# Patient Record
Sex: FEMALE | Race: OTHER | HISPANIC OR LATINO
[De-identification: names, ages, dates, MRNs, and addresses within clinical notes are randomized per-mention and may not be internally consistent; named-entity substitution may affect disease eponyms.]

---

## 2022-08-04 ENCOUNTER — APPOINTMENT (OUTPATIENT)
Dept: ANTEPARTUM | Facility: CLINIC | Age: 42
End: 2022-08-04

## 2022-08-25 ENCOUNTER — ASOB RESULT (OUTPATIENT)
Age: 42
End: 2022-08-25

## 2022-08-25 ENCOUNTER — APPOINTMENT (OUTPATIENT)
Dept: ANTEPARTUM | Facility: CLINIC | Age: 42
End: 2022-08-25

## 2022-08-25 PROCEDURE — 76811 OB US DETAILED SNGL FETUS: CPT

## 2022-08-25 PROCEDURE — 76819 FETAL BIOPHYS PROFIL W/O NST: CPT

## 2022-09-02 ENCOUNTER — INPATIENT (INPATIENT)
Facility: HOSPITAL | Age: 42
LOS: 0 days | Discharge: HOME | End: 2022-09-03
Attending: OBSTETRICS & GYNECOLOGY | Admitting: OBSTETRICS & GYNECOLOGY

## 2022-09-02 VITALS
SYSTOLIC BLOOD PRESSURE: 129 MMHG | DIASTOLIC BLOOD PRESSURE: 77 MMHG | TEMPERATURE: 98 F | RESPIRATION RATE: 17 BRPM | HEART RATE: 90 BPM

## 2022-09-02 DIAGNOSIS — Z3A.00 WEEKS OF GESTATION OF PREGNANCY NOT SPECIFIED: ICD-10-CM

## 2022-09-02 DIAGNOSIS — O26.899 OTHER SPECIFIED PREGNANCY RELATED CONDITIONS, UNSPECIFIED TRIMESTER: ICD-10-CM

## 2022-09-02 DIAGNOSIS — O47.03 FALSE LABOR BEFORE 37 COMPLETED WEEKS OF GESTATION, THIRD TRIMESTER: ICD-10-CM

## 2022-09-02 DIAGNOSIS — Z98.82 BREAST IMPLANT STATUS: Chronic | ICD-10-CM

## 2022-09-02 DIAGNOSIS — Z98.891 HISTORY OF UTERINE SCAR FROM PREVIOUS SURGERY: Chronic | ICD-10-CM

## 2022-09-02 LAB
APPEARANCE UR: CLEAR — SIGNIFICANT CHANGE UP
APTT BLD: 26.2 SEC — LOW (ref 27–36.3)
BASOPHILS # BLD AUTO: 0.03 K/UL — SIGNIFICANT CHANGE UP (ref 0–0.2)
BASOPHILS NFR BLD AUTO: 0.4 % — SIGNIFICANT CHANGE UP (ref 0–2)
BILIRUB UR-MCNC: NEGATIVE — SIGNIFICANT CHANGE UP
BLD GP AB SCN SERPL QL: NEGATIVE — SIGNIFICANT CHANGE UP
COLOR SPEC: SIGNIFICANT CHANGE UP
COVID-19 SPIKE DOMAIN AB INTERP: POSITIVE
COVID-19 SPIKE DOMAIN ANTIBODY RESULT: >250 U/ML — HIGH
DIFF PNL FLD: NEGATIVE — SIGNIFICANT CHANGE UP
EOSINOPHIL # BLD AUTO: 0.07 K/UL — SIGNIFICANT CHANGE UP (ref 0–0.5)
EOSINOPHIL NFR BLD AUTO: 0.9 % — SIGNIFICANT CHANGE UP (ref 0–6)
FIBRINOGEN PPP-MCNC: 753 MG/DL — HIGH (ref 330–520)
GLUCOSE UR QL: NEGATIVE — SIGNIFICANT CHANGE UP
HCT VFR BLD CALC: 32.7 % — LOW (ref 34.5–45)
HGB BLD-MCNC: 10.8 G/DL — LOW (ref 11.5–15.5)
IANC: 4.96 K/UL — SIGNIFICANT CHANGE UP (ref 1.8–7.4)
IMM GRANULOCYTES NFR BLD AUTO: 0.7 % — SIGNIFICANT CHANGE UP (ref 0–1.5)
INR BLD: 0.95 RATIO — SIGNIFICANT CHANGE UP (ref 0.88–1.16)
KETONES UR-MCNC: NEGATIVE — SIGNIFICANT CHANGE UP
LEUKOCYTE ESTERASE UR-ACNC: NEGATIVE — SIGNIFICANT CHANGE UP
LYMPHOCYTES # BLD AUTO: 1.78 K/UL — SIGNIFICANT CHANGE UP (ref 1–3.3)
LYMPHOCYTES # BLD AUTO: 23.5 % — SIGNIFICANT CHANGE UP (ref 13–44)
MCHC RBC-ENTMCNC: 29.3 PG — SIGNIFICANT CHANGE UP (ref 27–34)
MCHC RBC-ENTMCNC: 33 GM/DL — SIGNIFICANT CHANGE UP (ref 32–36)
MCV RBC AUTO: 88.9 FL — SIGNIFICANT CHANGE UP (ref 80–100)
MONOCYTES # BLD AUTO: 0.68 K/UL — SIGNIFICANT CHANGE UP (ref 0–0.9)
MONOCYTES NFR BLD AUTO: 9 % — SIGNIFICANT CHANGE UP (ref 2–14)
NEUTROPHILS # BLD AUTO: 4.96 K/UL — SIGNIFICANT CHANGE UP (ref 1.8–7.4)
NEUTROPHILS NFR BLD AUTO: 65.5 % — SIGNIFICANT CHANGE UP (ref 43–77)
NITRITE UR-MCNC: NEGATIVE — SIGNIFICANT CHANGE UP
NRBC # BLD: 0 /100 WBCS — SIGNIFICANT CHANGE UP (ref 0–0)
NRBC # FLD: 0 K/UL — SIGNIFICANT CHANGE UP (ref 0–0)
PH UR: 7 — SIGNIFICANT CHANGE UP (ref 5–8)
PLATELET # BLD AUTO: 187 K/UL — SIGNIFICANT CHANGE UP (ref 150–400)
PROT UR-MCNC: NEGATIVE — SIGNIFICANT CHANGE UP
PROTHROM AB SERPL-ACNC: 11 SEC — SIGNIFICANT CHANGE UP (ref 10.5–13.4)
RBC # BLD: 3.68 M/UL — LOW (ref 3.8–5.2)
RBC # FLD: 13.2 % — SIGNIFICANT CHANGE UP (ref 10.3–14.5)
RH IG SCN BLD-IMP: POSITIVE — SIGNIFICANT CHANGE UP
RH IG SCN BLD-IMP: POSITIVE — SIGNIFICANT CHANGE UP
SARS-COV-2 IGG+IGM SERPL QL IA: >250 U/ML — HIGH
SARS-COV-2 IGG+IGM SERPL QL IA: POSITIVE
SARS-COV-2 RNA SPEC QL NAA+PROBE: SIGNIFICANT CHANGE UP
SP GR SPEC: 1.01 — SIGNIFICANT CHANGE UP (ref 1.01–1.05)
T PALLIDUM AB TITR SER: NEGATIVE — SIGNIFICANT CHANGE UP
UROBILINOGEN FLD QL: SIGNIFICANT CHANGE UP
WBC # BLD: 7.57 K/UL — SIGNIFICANT CHANGE UP (ref 3.8–10.5)
WBC # FLD AUTO: 7.57 K/UL — SIGNIFICANT CHANGE UP (ref 3.8–10.5)

## 2022-09-02 RX ORDER — SODIUM CHLORIDE 9 MG/ML
1000 INJECTION, SOLUTION INTRAVENOUS
Refills: 0 | Status: DISCONTINUED | OUTPATIENT
Start: 2022-09-02 | End: 2022-09-03

## 2022-09-02 RX ORDER — FAMOTIDINE 10 MG/ML
20 INJECTION INTRAVENOUS ONCE
Refills: 0 | Status: DISCONTINUED | OUTPATIENT
Start: 2022-09-02 | End: 2022-09-03

## 2022-09-02 RX ORDER — SODIUM CHLORIDE 9 MG/ML
1000 INJECTION, SOLUTION INTRAVENOUS ONCE
Refills: 0 | Status: COMPLETED | OUTPATIENT
Start: 2022-09-02 | End: 2022-09-02

## 2022-09-02 RX ORDER — INFLUENZA VIRUS VACCINE 15; 15; 15; 15 UG/.5ML; UG/.5ML; UG/.5ML; UG/.5ML
0.5 SUSPENSION INTRAMUSCULAR ONCE
Refills: 0 | Status: DISCONTINUED | OUTPATIENT
Start: 2022-09-02 | End: 2022-09-03

## 2022-09-02 RX ADMIN — SODIUM CHLORIDE 1000 MILLILITER(S): 9 INJECTION, SOLUTION INTRAVENOUS at 08:45

## 2022-09-02 NOTE — OB PROVIDER TRIAGE NOTE - NS_CONTRACTPATTER_OBGYN_ALL_OB
----- Message from Ana Dow, RN sent at 6/8/2021  8:21 AM CDT -----  Regarding: CHF Weekly Hospital 30D  CHF weekly and PRN follow up. DC 6/7/21.  Follow up acutely through 7/7/21.  DC to [facility].        [facility]   P [phone] (option 3)  F        Uterine Irritability

## 2022-09-02 NOTE — OB PROVIDER LABOR PROGRESS NOTE - ASSESSMENT
40yo  @35+4 being observed for r/o PTL    - SVE unchanged: 0/0/-3  - FHT Cat 1, reassuring, ctm  - Patient not currently in labor. Will continue to monitor overnight.  - Patient may eat 1 meal and then go back to [facility]    d/w Dr. Chuy Styles, PGY2

## 2022-09-02 NOTE — OB PROVIDER H&P - NSHPLABSRESULTS_GEN_ALL_CORE
Urinalysis Basic - ( 02 Sep 2022 07:58 )    Color: Light Yellow / Appearance: Clear / S.010 / pH: x  Gluc: x / Ketone: Negative  / Bili: Negative / Urobili: <2 mg/dL   Blood: x / Protein: Negative / Nitrite: Negative   Leuk Esterase: Negative / RBC: x / WBC x   Sq Epi: x / Non Sq Epi: x / Bacteria: x    CBC Full  -  ( 02 Sep 2022 08:55 )  WBC Count : 7.57 K/uL  RBC Count : 3.68 M/uL  Hemoglobin : 10.8 g/dL  Hematocrit : 32.7 %  Platelet Count - Automated : 187 K/uL  Mean Cell Volume : 88.9 fL  Mean Cell Hemoglobin : 29.3 pg  Mean Cell Hemoglobin Concentration : 33.0 gm/dL  Auto Neutrophil # : 4.96 K/uL  Auto Lymphocyte # : 1.78 K/uL  Auto Monocyte # : 0.68 K/uL  Auto Eosinophil # : 0.07 K/uL  Auto Basophil # : 0.03 K/uL  Auto Neutrophil % : 65.5 %  Auto Lymphocyte % : 23.5 %  Auto Monocyte % : 9.0 %  Auto Eosinophil % : 0.9 %  Auto Basophil % : 0.4 %    PT/INR - ( 02 Sep 2022 08:55 )   PT: 11.0 sec;   INR: 0.95 ratio         PTT - ( 02 Sep 2022 08:55 )  PTT:26.2 sec

## 2022-09-02 NOTE — OB RN PATIENT PROFILE - FUNCTIONAL ASSESSMENT - BASIC MOBILITY 6.
4-calculated by average/Not able to assess (calculate score using [facility] averaging method)

## 2022-09-02 NOTE — OB PROVIDER TRIAGE NOTE - NSHPLABSRESULTS_GEN_ALL_CORE
Urinalysis Basic - ( 02 Sep 2022 07:58 )    Color: Light Yellow / Appearance: Clear / S.010 / pH: x  Gluc: x / Ketone: Negative  / Bili: Negative / Urobili: <2 mg/dL   Blood: x / Protein: Negative / Nitrite: Negative   Leuk Esterase: Negative / RBC: x / WBC x   Sq Epi: x / Non Sq Epi: x / Bacteria: x

## 2022-09-02 NOTE — OB PROVIDER TRIAGE NOTE - NS_OBGYNHISTORY_OBGYN_ALL_OB_FT
4/16/2020 csection failure to descend 3815g  sab x1 complete breast augmentation  complete sab x 1  4/16/2022 pLTCS failure to decent 8lbs 7oz breast augmentation  complete sab x 1  4/16/2020 pLTCS failure to decent 8lbs 7oz

## 2022-09-02 NOTE — OB RN TRIAGE NOTE - FALL HARM RISK - UNIVERSAL INTERVENTIONS
Bed in lowest position, wheels locked, appropriate side rails in place/Call bell, personal items and telephone in reach/Instruct patient to call for assistance before getting out of bed or chair/Non-slip footwear when patient is out of bed/[place] to call system/Physically safe environment - no spills, clutter or unnecessary equipment/Purposeful Proactive Rounding/Room/bathroom lighting operational, light cord in reach

## 2022-09-02 NOTE — OB RN PATIENT PROFILE - FALL HARM RISK - UNIVERSAL INTERVENTIONS
Bed in lowest position, wheels locked, appropriate side rails in place/Call bell, personal items and telephone in reach/Instruct patient to call for assistance before getting out of bed or chair/Non-slip footwear when patient is out of bed/[place] to call system/Physically safe environment - no spills, clutter or unnecessary equipment/Purposeful Proactive Rounding/Room/bathroom lighting operational, light cord in reach

## 2022-09-02 NOTE — OB PROVIDER LABOR PROGRESS NOTE - NS_SUBJECTIVE/OBJECTIVE_OBGYN_ALL_OB_FT
Patient miguel ángel q3-4 min but does not feel them. She is comfortable.    T(C): 36.7 (09-02-22 @ 10:46), Max: 36.8 (09-02-22 @ 07:25)  HR: 59 (09-02-22 @ 16:29) (59 - 90)  BP: 117/74 (09-02-22 @ 16:29) (112/67 - 158/80)  RR: 18 (09-02-22 @ 10:46) (17 - 18)  SpO2: --

## 2022-09-02 NOTE — OB PROVIDER H&P - NSHPPHYSICALEXAM_GEN_ALL_CORE
abdomen soft, nontender  taus: sliup, cephalic presentation, posterior placenta, bpp 8/8, juanita 18.49, efw 3417 grams  sve: 0/50/-3/I  nst in progress

## 2022-09-02 NOTE — OB PROVIDER TRIAGE NOTE - HISTORY OF PRESENT ILLNESS
41 y.o.  CAMILA 10/10/2022 @ 34.4 weeks presents with c/o pelvic pain with ambulation 3/10 pain. Pt denies LOF, VB, ctx. States +FM.     pt is vaccinated against covid 19    med/surg hx:  cs x 1  bilateral breast augmentation    OBGYN hx:  breast augmentation  complete sab x 1  2022 pLTCS failure to decent 8lbs 7oz 41 y.o.  CAMILA 10/10/2022 @ 34.4 weeks presents with c/o pelvic pain with ambulation 3/10 pain. Pt denies LOF, VB, ctx. States +FM.     pt denies hx of covid 19 infection. pt is vaccinated against covid 19 x 3.    med/surg hx:  cs x 1  bilateral breast augmentation    OBGYN hx:  breast augmentation  complete sab x 1  2022 pLTCS failure to decent 8lbs 7oz 41 y.o.  CAMILA 10/10/2022 @ 34.4 weeks presents with c/o pelvic pain with ambulation 3/10 pain. Pt denies LOF, VB, ctx. States +FM.     pt denies hx of covid 19 infection. pt is vaccinated against covid 19 x 3.    med/surg hx:  cs x 1  bilateral breast augmentation    OBGYN hx:  breast augmentation  complete sab x 1  2020 pLTCS failure to decent 8lbs 7oz 41 y.o.  CAMILA 10/03/2022 @ 35.4 weeks presents with c/o pelvic pain with ambulation 3/10 pain. Pt denies LOF, VB, ctx. States +FM.     pt denies hx of covid 19 infection. pt is vaccinated against covid 19 x 3.    med/surg hx:  cs x 1  bilateral breast augmentation    OBGYN hx:  breast augmentation  complete sab x 1  2020 pLTCS failure to decent 8lbs 7oz

## 2022-09-02 NOTE — OB PROVIDER H&P - HISTORY OF PRESENT ILLNESS
41 y.o.  CAMILA 10/10/2022 @ 34.4 weeks presents with c/o pelvic pain with ambulation 3/10 pain. Pt denies LOF, VB, ctx. States +FM.     pt denies hx of covid 19 infection. pt is vaccinated against covid 19 x 3.    med/surg hx:  cs x 1  bilateral breast augmentation    OBGYN hx:  breast augmentation  complete sab x 1  2020 pLTCS failure to decent 8lbs 7oz 41 y.o.  CAMILA 10/10/2022 @ 35.4 weeks presents with c/o pelvic pain with ambulation 3/10 pain. Pt denies LOF, VB, ctx. States +FM.     pt denies hx of covid 19 infection. pt is vaccinated against covid 19 x 3.    med/surg hx:  cs x 1  bilateral breast augmentation    OBGYN hx:  breast augmentation  complete sab x 1  2020 pLTCS failure to decent 8lbs 7oz 41 y.o.  CAMILA 10/03/2022 @ 35.4 weeks presents with c/o pelvic pain with ambulation 3/10 pain. Pt denies LOF, VB, ctx. States +FM.     pt denies hx of covid 19 infection. pt is vaccinated against covid 19 x 3.    med/surg hx:  cs x 1  bilateral breast augmentation    OBGYN hx:  breast augmentation  complete sab x 1  2020 pLTCS failure to decent 8lbs 7oz

## 2022-09-02 NOTE — OB PROVIDER H&P - ASSESSMENT
a/p: 41 y.o.  CAMILA 10/10/2022 @ 34.4 weeks  contractions for observation    ordered labs reviewed: WNL  Discussed with Dr Chuy Dobbs at bedside. Plan for admission and observation. Plan of care reviewed with patient.     covid 19 swab collected and pending    Adenike, NP a/p: 41 y.o.  CAMILA 10/10/2022 @ 35.4 weeks  contractions for observation    ordered labs reviewed: WNL  Discussed with Dr Chuy Dobbs at bedside. Plan for admission and observation. Plan of care reviewed with patient.     covid 19 swab collected and pending    Adenike, NP a/p: 41 y.o.  CAMILA 10/03/2022 @ 35.4 weeks  contractions for observation    ordered labs reviewed: WNL  Discussed with Dr Chuy Dobbs at bedside. Plan for admission and observation. Plan of care reviewed with patient.     covid 19 swab collected and pending    Adenike, NP

## 2022-09-02 NOTE — OB PROVIDER TRIAGE NOTE - NSHPPHYSICALEXAM_GEN_ALL_CORE
abdomen soft, nontender  sve: 0/50/-3/I  nst in progress abdomen soft, nontender  taus: sliup, cephalic presentation, posterior placenta, bpp 8/8, juanita 18.49, efw 3417 grams  sve: 0/50/-3/I  nst in progress

## 2022-09-03 ENCOUNTER — TRANSCRIPTION ENCOUNTER (OUTPATIENT)
Age: 42
End: 2022-09-03

## 2022-09-03 VITALS — DIASTOLIC BLOOD PRESSURE: 75 MMHG | HEART RATE: 76 BPM | SYSTOLIC BLOOD PRESSURE: 115 MMHG

## 2022-09-03 RX ADMIN — SODIUM CHLORIDE 125 MILLILITER(S): 9 INJECTION, SOLUTION INTRAVENOUS at 07:53

## 2022-09-03 NOTE — DISCHARGE NOTE ANTEPARTUM - PLAN OF CARE
Patient examined and found to make no cervical change (0/0/-3) after a period of observation. Patient also did not feel any contractions and fetal status reassuring.

## 2022-09-03 NOTE — DISCHARGE NOTE ANTEPARTUM - PATIENT PORTAL LINK FT
You can access the FollowMyHealth Patient Portal offered by [facility] by registering at the following website: [email or web address]. By joining [employer]’s FollowMyHealth portal, you will also be able to view your health information using other applications (apps) compatible with our system.

## 2022-09-03 NOTE — CHART NOTE - NSCHARTNOTEFT_GEN_A_CORE
R3 Chart Note     Patient seen bedside for reevaluation. She continues to report that she does not feel any contractions, continues to have some groin pain. While speaking to patient she began vomiting which she reports occurs when she does not have food.    Vital Signs Last 24 Hrs  T(C): 36.7 (03 Sep 2022 07:15), Max: 36.9 (02 Sep 2022 20:08)  T(F): 98.06 (03 Sep 2022 07:15), Max: 98.42 (02 Sep 2022 20:08)  HR: 60 (03 Sep 2022 07:15) (59 - 75)  BP: 125/80 (03 Sep 2022 07:15) (106/54 - 158/80)  BP(mean): --  RR: 22 (03 Sep 2022 07:13) (18 - 22)  SpO2: --    General: vomiting, otherwise NAD   SVE: 0/0/-3   FHR: 130s, moderate variability, accelerations present, no decels   [place]: q3-4 min    41 y.o.  at 35w5d presenting with groin pain found to be miguel ángel regularly. Patient has not felt the contractions throughout the night, exam unchanged.     - Discharge with  precautions   - f/u as scheduled with OB     d/w Dr. Freya Nielson PGY-3 R3 Chart Note     Patient seen bedside for reevaluation. She continues to report that she does not feel any contractions, continues to have some groin pain. While speaking to patient she began vomiting which she reports occurs when she does not have food.    Vital Signs Last 24 Hrs  T(C): 36.7 (03 Sep 2022 07:15), Max: 36.9 (02 Sep 2022 20:08)  T(F): 98.06 (03 Sep 2022 07:15), Max: 98.42 (02 Sep 2022 20:08)  HR: 60 (03 Sep 2022 07:15) (59 - 75)  BP: 125/80 (03 Sep 2022 07:15) (106/54 - 158/80)  BP(mean): --  RR: 22 (03 Sep 2022 07:13) (18 - 22)  SpO2: --    General: vomiting, otherwise NAD   SVE: 0/0/-3   FHR: baseline 125, moderate variability, accelerations present, no decels   [place]: q3-4 min    41 y.o.  at 35w5d presenting with groin pain found to be miguel ángel regularly. Patient has not felt the contractions throughout the night, exam unchanged.     - Discharge with  precautions   - f/u as scheduled with OB     d/w Dr. Freya Nielson PGY-3

## 2022-09-03 NOTE — DISCHARGE NOTE ANTEPARTUM - CARE PLAN
1 Principal Discharge DX:	 contractions  Assessment and plan of treatment:	Patient examined and found to make no cervical change (0/0/-3) after a period of observation. Patient also did not feel any contractions and fetal status reassuring.

## 2022-09-03 NOTE — PROGRESS NOTE ADULT - SUBJECTIVE AND OBJECTIVE BOX
R3 Antepartum Note, HD#2    Interval events: Patient seen and examined at bedside, no acute overnight events. No acute complaints. Not reporting any contraction pain. Pt reports +FM, denies LOF, VB,  HA, epigastric pain, blurred vision, CP, SOB, N/V, fevers, and chills.    Vital Signs Last 24 Hours  T(C): 36.7 (09-03-22 @ 03:50), Max: 36.9 (09-02-22 @ 20:08)  HR: 75 (09-03-22 @ 03:52) (59 - 90)  BP: 130/60 (09-03-22 @ 03:52) (112/67 - 158/80)  RR: 18 (09-02-22 @ 10:46) (17 - 18)  SpO2: --  Physical Exam:  General: NAD  Abdomen: Soft, non-tender, gravid  Ext: No pain or swelling    NST reactive overnight  [place]:q3 min    Labs:             10.8   7.57  )-----------( 187      ( 09-02 @ 08:55 )             32.7         PT/INR - ( 09-02 @ 08:55 )   PT: 11.0 sec;   INR: 0.95 ratio    PTT - ( 09-02 @ 08:55 )  PTT:26.2 sec    Uric Acid: (09-02 @ 08:55)  --       Fibrinogen: (09-02 @ 08:55)  753      LDH: (09-02 @ 08:55)  --         MEDICATIONS  (STANDING):  famotidine Injectable 20 milliGRAM(s) IV Push once  influenza   Vaccine 0.5 milliLiter(s) IntraMuscular once  lactated ringers. 1000 milliLiter(s) (125 mL/Hr) IV Continuous <Continuous>

## 2022-09-03 NOTE — DISCHARGE NOTE ANTEPARTUM - HOSPITAL COURSE
41 y.o.  presented initially at 35w4d with complaints of pelvic pain with ambulation. She did not report contraction pain. She was examined, found to be closed but miguel ángel regularly so was kept for observation overnight given history of prior  delivery. Labs were done and she was noted to be anemic (10.8/32.7), otherwise within normal limits. She was reexamined several hours later and found to not make any cervical change and was discharged with  labor precautions and to follow up closely with Dr. Jacobo.

## 2022-09-03 NOTE — DISCHARGE NOTE ANTEPARTUM - MEDICATION SUMMARY - MEDICATIONS TO TAKE
I will START or STAY ON the medications listed below when I get home from the hospital:    PNV Prenatal oral tablet  -- 1 tab(s) by mouth once a day  -- Indication: For Prenatal

## 2022-09-03 NOTE — PROGRESS NOTE ADULT - ASSESSMENT
41 y.o.  CAMILA 10/03/2022 @ 35.5 weeks with hx of prior CS presented with pelvic pain, monitored overnight in the setting of contractions q3 min. Patient overall comfortable.   - continuous EFM, [place]  - SCD's for DVT ppx  - NPO    Sasha Adams, PGY-3

## 2022-09-03 NOTE — DISCHARGE NOTE ANTEPARTUM - NS MD DC FALL RISK RISK
For information on Fall & Injury Prevention, visit: [email or web address] OR  [email or web address] OR  https://www.cdc.gov/steadi/patient.html

## 2022-09-03 NOTE — DISCHARGE NOTE ANTEPARTUM - ADDITIONAL INSTRUCTIONS
You were admitted for observation due to having  contractions. Return to labor and delivery if you have vaginal bleeding, leakage of fluid, regular contractions, or the baby is not moving well.

## 2022-09-11 PROBLEM — Z78.9 OTHER SPECIFIED HEALTH STATUS: Chronic | Status: ACTIVE | Noted: 2022-09-02

## 2022-09-14 ENCOUNTER — TRANSCRIPTION ENCOUNTER (OUTPATIENT)
Age: 42
End: 2022-09-14

## 2022-09-14 ENCOUNTER — INPATIENT (INPATIENT)
Facility: HOSPITAL | Age: 42
LOS: 2 days | Discharge: HOME | End: 2022-09-17
Attending: OBSTETRICS & GYNECOLOGY | Admitting: OBSTETRICS & GYNECOLOGY

## 2022-09-14 VITALS
SYSTOLIC BLOOD PRESSURE: 136 MMHG | DIASTOLIC BLOOD PRESSURE: 84 MMHG | HEART RATE: 80 BPM | TEMPERATURE: 98 F | RESPIRATION RATE: 17 BRPM

## 2022-09-14 DIAGNOSIS — Z3A.00 WEEKS OF GESTATION OF PREGNANCY NOT SPECIFIED: ICD-10-CM

## 2022-09-14 DIAGNOSIS — O13.3 GESTATIONAL [PREGNANCY-INDUCED] HYPERTENSION WITHOUT SIGNIFICANT PROTEINURIA, THIRD TRIMESTER: ICD-10-CM

## 2022-09-14 DIAGNOSIS — Z98.891 HISTORY OF UTERINE SCAR FROM PREVIOUS SURGERY: Chronic | ICD-10-CM

## 2022-09-14 DIAGNOSIS — O14.93 UNSPECIFIED PRE-ECLAMPSIA, THIRD TRIMESTER: ICD-10-CM

## 2022-09-14 DIAGNOSIS — O26.899 OTHER SPECIFIED PREGNANCY RELATED CONDITIONS, UNSPECIFIED TRIMESTER: ICD-10-CM

## 2022-09-14 DIAGNOSIS — Z98.82 BREAST IMPLANT STATUS: Chronic | ICD-10-CM

## 2022-09-14 LAB
ALBUMIN SERPL ELPH-MCNC: 3.4 G/DL — SIGNIFICANT CHANGE UP (ref 3.3–5)
ALP SERPL-CCNC: 231 U/L — HIGH (ref 40–120)
ALT FLD-CCNC: 27 U/L — SIGNIFICANT CHANGE UP (ref 4–33)
ANION GAP SERPL CALC-SCNC: 10 MMOL/L — SIGNIFICANT CHANGE UP (ref 7–14)
APPEARANCE UR: CLEAR — SIGNIFICANT CHANGE UP
APTT BLD: 27.3 SEC — SIGNIFICANT CHANGE UP (ref 27–36.3)
AST SERPL-CCNC: 26 U/L — SIGNIFICANT CHANGE UP (ref 4–32)
BACTERIA # UR AUTO: NEGATIVE — SIGNIFICANT CHANGE UP
BASOPHILS # BLD AUTO: 0.03 K/UL — SIGNIFICANT CHANGE UP (ref 0–0.2)
BASOPHILS NFR BLD AUTO: 0.4 % — SIGNIFICANT CHANGE UP (ref 0–2)
BILIRUB SERPL-MCNC: 0.3 MG/DL — SIGNIFICANT CHANGE UP (ref 0.2–1.2)
BILIRUB UR-MCNC: NEGATIVE — SIGNIFICANT CHANGE UP
BLD GP AB SCN SERPL QL: NEGATIVE — SIGNIFICANT CHANGE UP
BUN SERPL-MCNC: 12 MG/DL — SIGNIFICANT CHANGE UP (ref 7–23)
CALCIUM SERPL-MCNC: 8.9 MG/DL — SIGNIFICANT CHANGE UP (ref 8.4–10.5)
CHLORIDE SERPL-SCNC: 105 MMOL/L — SIGNIFICANT CHANGE UP (ref 98–107)
CO2 SERPL-SCNC: 20 MMOL/L — LOW (ref 22–31)
COLOR SPEC: COLORLESS — SIGNIFICANT CHANGE UP
CREAT ?TM UR-MCNC: 24 MG/DL — SIGNIFICANT CHANGE UP
CREAT SERPL-MCNC: 0.55 MG/DL — SIGNIFICANT CHANGE UP (ref 0.5–1.3)
DIFF PNL FLD: NEGATIVE — SIGNIFICANT CHANGE UP
EGFR: 119 ML/MIN/1.73M2 — SIGNIFICANT CHANGE UP
EOSINOPHIL # BLD AUTO: 0.04 K/UL — SIGNIFICANT CHANGE UP (ref 0–0.5)
EOSINOPHIL NFR BLD AUTO: 0.6 % — SIGNIFICANT CHANGE UP (ref 0–6)
EPI CELLS # UR: 2 /HPF — SIGNIFICANT CHANGE UP (ref 0–5)
FIBRINOGEN PPP-MCNC: 765 MG/DL — HIGH (ref 330–520)
GLUCOSE SERPL-MCNC: 82 MG/DL — SIGNIFICANT CHANGE UP (ref 70–99)
GLUCOSE UR QL: NEGATIVE — SIGNIFICANT CHANGE UP
HCT VFR BLD CALC: 32.7 % — LOW (ref 34.5–45)
HGB BLD-MCNC: 11.1 G/DL — LOW (ref 11.5–15.5)
IANC: 4.36 K/UL — SIGNIFICANT CHANGE UP (ref 1.8–7.4)
IMM GRANULOCYTES NFR BLD AUTO: 0.6 % — SIGNIFICANT CHANGE UP (ref 0–1.5)
INR BLD: 0.92 RATIO — SIGNIFICANT CHANGE UP (ref 0.88–1.16)
KETONES UR-MCNC: NEGATIVE — SIGNIFICANT CHANGE UP
LDH SERPL L TO P-CCNC: 188 U/L — SIGNIFICANT CHANGE UP (ref 135–225)
LEUKOCYTE ESTERASE UR-ACNC: ABNORMAL
LYMPHOCYTES # BLD AUTO: 1.75 K/UL — SIGNIFICANT CHANGE UP (ref 1–3.3)
LYMPHOCYTES # BLD AUTO: 25.6 % — SIGNIFICANT CHANGE UP (ref 13–44)
MCHC RBC-ENTMCNC: 29.3 PG — SIGNIFICANT CHANGE UP (ref 27–34)
MCHC RBC-ENTMCNC: 33.9 GM/DL — SIGNIFICANT CHANGE UP (ref 32–36)
MCV RBC AUTO: 86.3 FL — SIGNIFICANT CHANGE UP (ref 80–100)
MONOCYTES # BLD AUTO: 0.62 K/UL — SIGNIFICANT CHANGE UP (ref 0–0.9)
MONOCYTES NFR BLD AUTO: 9.1 % — SIGNIFICANT CHANGE UP (ref 2–14)
NEUTROPHILS # BLD AUTO: 4.36 K/UL — SIGNIFICANT CHANGE UP (ref 1.8–7.4)
NEUTROPHILS NFR BLD AUTO: 63.7 % — SIGNIFICANT CHANGE UP (ref 43–77)
NITRITE UR-MCNC: NEGATIVE — SIGNIFICANT CHANGE UP
NRBC # BLD: 0 /100 WBCS — SIGNIFICANT CHANGE UP (ref 0–0)
NRBC # FLD: 0 K/UL — SIGNIFICANT CHANGE UP (ref 0–0)
PH UR: 7 — SIGNIFICANT CHANGE UP (ref 5–8)
PLATELET # BLD AUTO: 181 K/UL — SIGNIFICANT CHANGE UP (ref 150–400)
POTASSIUM SERPL-MCNC: 4.2 MMOL/L — SIGNIFICANT CHANGE UP (ref 3.5–5.3)
POTASSIUM SERPL-SCNC: 4.2 MMOL/L — SIGNIFICANT CHANGE UP (ref 3.5–5.3)
PROT ?TM UR-MCNC: 12 MG/DL — SIGNIFICANT CHANGE UP
PROT ?TM UR-MCNC: 12 MG/DL — SIGNIFICANT CHANGE UP
PROT SERPL-MCNC: 6.8 G/DL — SIGNIFICANT CHANGE UP (ref 6–8.3)
PROT UR-MCNC: NEGATIVE — SIGNIFICANT CHANGE UP
PROT/CREAT UR-RTO: 0.5 RATIO — HIGH (ref 0–0.2)
PROTHROM AB SERPL-ACNC: 10.7 SEC — SIGNIFICANT CHANGE UP (ref 10.5–13.4)
RBC # BLD: 3.79 M/UL — LOW (ref 3.8–5.2)
RBC # FLD: 13.8 % — SIGNIFICANT CHANGE UP (ref 10.3–14.5)
RBC CASTS # UR COMP ASSIST: 1 /HPF — SIGNIFICANT CHANGE UP (ref 0–4)
RH IG SCN BLD-IMP: POSITIVE — SIGNIFICANT CHANGE UP
SARS-COV-2 RNA SPEC QL NAA+PROBE: SIGNIFICANT CHANGE UP
SODIUM SERPL-SCNC: 135 MMOL/L — SIGNIFICANT CHANGE UP (ref 135–145)
SP GR SPEC: 1.01 — LOW (ref 1.01–1.05)
URATE SERPL-MCNC: 5.1 MG/DL — SIGNIFICANT CHANGE UP (ref 2.5–7)
UROBILINOGEN FLD QL: SIGNIFICANT CHANGE UP
WBC # BLD: 6.84 K/UL — SIGNIFICANT CHANGE UP (ref 3.8–10.5)
WBC # FLD AUTO: 6.84 K/UL — SIGNIFICANT CHANGE UP (ref 3.8–10.5)
WBC UR QL: 1 /HPF — SIGNIFICANT CHANGE UP (ref 0–5)

## 2022-09-14 RX ORDER — INFLUENZA VIRUS VACCINE 15; 15; 15; 15 UG/.5ML; UG/.5ML; UG/.5ML; UG/.5ML
0.5 SUSPENSION INTRAMUSCULAR ONCE
Refills: 0 | Status: DISCONTINUED | OUTPATIENT
Start: 2022-09-14 | End: 2022-09-17

## 2022-09-14 NOTE — OB PROVIDER H&P - HISTORY OF PRESENT ILLNESS
[phone] 41y  36w2d presenting for evaluation for elevated BP at work today. BP@ 17:24 152/94. 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   Denies HA, N/V, epigastric pain, visual changes. Denies LOF, VB and reports GFM. Patient given 24 hour urine bucket to complete and return to office next week due to leg swelling.   Patient admitted on  for pre-term ctx, at that time some systolic BP's noted to be 140's-150's. Denies any high bp's on office.     H/O Breast Augmentation   41y  37w2d presenting for evaluation for elevated BP at work today. BP@ 17:24 152/94. 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   Denies HA, N/V, epigastric pain, visual changes. Denies LOF, VB and reports GFM. Patient given 24 hour urine bucket to complete and return to office next week due to leg swelling.   Patient admitted on  for pre-term ctx, at that time some systolic BP's noted to be 140's-150's. Denies any high bp's on office.     H/O Breast Augmentation

## 2022-09-14 NOTE — OB RN PATIENT PROFILE - FALL HARM RISK - UNIVERSAL INTERVENTIONS
Bed in lowest position, wheels locked, appropriate side rails in place/Call bell, personal items and telephone in reach/Instruct patient to call for assistance before getting out of bed or chair/Non-slip footwear when patient is out of bed/[place] to call system/Physically safe environment - no spills, clutter or unnecessary equipment/Purposeful Proactive Rounding/Room/bathroom lighting operational, light cord in reach

## 2022-09-14 NOTE — OB RN TRIAGE NOTE - FALL HARM RISK - UNIVERSAL INTERVENTIONS
Bed in lowest position, wheels locked, appropriate side rails in place/Call bell, personal items and telephone in reach/Instruct patient to call for assistance before getting out of bed or chair/Non-slip footwear when patient is out of bed/[place] to call system/Physically safe environment - no spills, clutter or unnecessary equipment/Purposeful Proactive Rounding/Room/bathroom lighting operational, light cord in reach

## 2022-09-14 NOTE — OB PROVIDER H&P - TERM DELIVERIES, OB PROFILE
Dr. Amaury Sahu. Patient's insurance denied in lab titration study. Patient had HST. Do you just want to order auto CPAP for pt.
From: Niya Austin  To: Dr. Cecilia Randall: 6/12/2022 12:44 PM EDT  Subject: Insurance letter     Norman Sheikh I got some sort of letter from my insurance about me not meeting the requirements for something it wasnt clear of it was just for the machine or if it was in regards to the the in hospital thing I have later this month?
Yes, autoCPAP is fine. Dr Huber Rivera will write prescription.
1

## 2022-09-14 NOTE — OB PROVIDER H&P - NSHPPHYSICALEXAM_GEN_ALL_CORE
Assessment reveals VSS, abdomen soft, NT, gravid.  BP range 123//84 pulse 76  Cat 1 FHT, ctx 5-8 on toco- patient only feel ctx when shes standing up.   HELLP labs sent.    VE 1/80/-3  (VE 0/50 on 9/2)   BPP 8/8, CAMRYN 16, vtx, posterior placenta.    HELLP labs WNL  PCR 0.5

## 2022-09-14 NOTE — OB RN TRIAGE NOTE - NS_OBGYNHISTORY_OBGYN_ALL_OB_FT
4/16/2020 csection failure to descend 3815g  sab x1 complete 4/16/2020 C/S failure to descend 3815g  sab x1 complete

## 2022-09-14 NOTE — OB PROVIDER H&P - GRAVIDA, OB PROFILE
Pt presents with complaints of discharge coming from eyes. Slight redness in right eye also. Mom states that she was notified by  providers because there has been confirmed cases. Sx started today. Mom states she has had no otc meds.   3

## 2022-09-14 NOTE — OB PROVIDER TRIAGE NOTE - NS_OBGYNHISTORY_OBGYN_ALL_OB_FT
4/16/2020 csection failure to descend 3815g  sab x1 complete 4/16/2020 csection failure to descend 3815g  sab x1 complete    Ap course uncomplicated thus far 4/16/2020 csection failure to descend at 10cm after pushing for 3 hours- 3815g  sab x1 complete    Ap course uncomplicated thus far 4/16/2020 csection failure to descend at 10cm after pushing for 3 hours- 3815g  sab x1 complete    Ap course uncomplicated thus far  GBS unknown 4/16/2020 csection failure to descend at 10cm after pushing for 3 hours- 3815g  sab x1 complete    Ap course uncomplicated thus far  Patient reports GBS negative, will email result.

## 2022-09-14 NOTE — OB PROVIDER TRIAGE NOTE - NSHPPHYSICALEXAM_GEN_ALL_CORE
Assessment reveals VSS, abdomen soft, NT, gravid.  Cat 1 FHT, ctx 5-8 on toco  HELLP labs sent. Assessment reveals VSS, abdomen soft, NT, gravid.  BP range 123//84 pulse 76  Cat 1 FHT, ctx 5-8 on toco- patient only feel ctx when shes standing up.   HELLP labs sent.    VE 1/80/-3  BPP 8/8, CAMRYN 16, vtx, posterior placenta. Assessment reveals VSS, abdomen soft, NT, gravid.  BP range 123//84 pulse 76  Cat 1 FHT, ctx 5-8 on toco- patient only feel ctx when shes standing up.   HELLP labs sent.    VE 1/80/-3  (VE 0/50 on 9/2)   BPP 8/8, CAMRYN 16, vtx, posterior placenta.    HELLP labs WNL  PCR 0.5

## 2022-09-14 NOTE — OB PROVIDER H&P - NS_OBGYNHISTORY_OBGYN_ALL_OB_FT
4/16/2020 csection failure to descend at 10cm after pushing for 3 hours- 3815g  sab x1 complete    Ap course uncomplicated thus far  Patient reports GBS negative, will email result.

## 2022-09-14 NOTE — OB RN TRIAGE NOTE - NS_MODEOFARRIVAL_OBGYN_ALL_OB
How Severe Is Your Dry Skin?: mild
Additional History: New patient \\nDry flaky skin on fingers, scalp, and toes \\nHasn’t tried any medications
Car

## 2022-09-14 NOTE — OB PROVIDER TRIAGE NOTE - HISTORY OF PRESENT ILLNESS
Ms. NIXON VILLEDA is a 41y  36w2d presenting with elevated pressures at work, checked @ 17:24 152/94. 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 BP@ 17:24 152/94. 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   Denies HA, N/V, epigastric pain, visual changes. Denies LOF, VB and reports GFM.     H/O Breast Augmentation   41y  36w2d presenting for evaluation for elevated BP at work today. BP@ 17:24 152/94. 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   Denies HA, N/V, epigastric pain, visual changes. Denies LOF, VB and reports GFM. Patient given 24 hour urine bucket to complete and return to office next week due to leg swelling.     H/O Breast Augmentation   41y  36w2d presenting for evaluation for elevated BP at work today. BP@ 17:24 152/94. 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   Denies HA, N/V, epigastric pain, visual changes. Denies LOF, VB and reports GFM. Patient given 24 hour urine bucket to complete and return to office next week due to leg swelling.   Patient admitted on  for pre-term ctx, at that time some systolic BP's noted to be 140's-150's    H/O Breast Augmentation   41y  36w2d presenting for evaluation for elevated BP at work today. BP@ 17:24 152/94. 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   Denies HA, N/V, epigastric pain, visual changes. Denies LOF, VB and reports GFM. Patient given 24 hour urine bucket to complete and return to office next week due to leg swelling.   Patient admitted on  for pre-term ctx, at that time some systolic BP's noted to be 140's-150's. Denies any high bp's on office.     H/O Breast Augmentation

## 2022-09-14 NOTE — OB PROVIDER H&P - NSPRIMARYCAREPROV_OBGYN_ALL_OB
Pt. Reports just not feeling well for the past few days, complains of head pain, coughing with chest pain and abd pain with vomiting.   MD//TOMMIE/MINESH

## 2022-09-14 NOTE — OB PROVIDER H&P - ASSESSMENT
Admit for BP monitoring  24 hour urine collection  HELLP labs in AM  COVID-19 PCR  NST BID  NPO at midnight  D/W Dr. Pillai Admit for BP monitoring/PEC  24 hour urine collection  HELLP labs in AM  COVID-19 PCR  NST BID  NPO at midnight  D/W Dr. Pillai

## 2022-09-15 ENCOUNTER — RESULT REVIEW (OUTPATIENT)
Age: 42
End: 2022-09-15

## 2022-09-15 LAB
ALBUMIN SERPL ELPH-MCNC: 3.2 G/DL — LOW (ref 3.3–5)
ALP SERPL-CCNC: 224 U/L — HIGH (ref 40–120)
ALT FLD-CCNC: 23 U/L — SIGNIFICANT CHANGE UP (ref 4–33)
ANION GAP SERPL CALC-SCNC: 9 MMOL/L — SIGNIFICANT CHANGE UP (ref 7–14)
APTT BLD: 27.7 SEC — SIGNIFICANT CHANGE UP (ref 27–36.3)
AST SERPL-CCNC: 21 U/L — SIGNIFICANT CHANGE UP (ref 4–32)
BASOPHILS # BLD AUTO: 0.03 K/UL — SIGNIFICANT CHANGE UP (ref 0–0.2)
BASOPHILS NFR BLD AUTO: 0.4 % — SIGNIFICANT CHANGE UP (ref 0–2)
BILIRUB SERPL-MCNC: 0.4 MG/DL — SIGNIFICANT CHANGE UP (ref 0.2–1.2)
BUN SERPL-MCNC: 10 MG/DL — SIGNIFICANT CHANGE UP (ref 7–23)
CALCIUM SERPL-MCNC: 8.7 MG/DL — SIGNIFICANT CHANGE UP (ref 8.4–10.5)
CHLORIDE SERPL-SCNC: 103 MMOL/L — SIGNIFICANT CHANGE UP (ref 98–107)
CO2 SERPL-SCNC: 22 MMOL/L — SIGNIFICANT CHANGE UP (ref 22–31)
COVID-19 SPIKE DOMAIN AB INTERP: POSITIVE
COVID-19 SPIKE DOMAIN ANTIBODY RESULT: >250 U/ML — HIGH
CREAT SERPL-MCNC: 0.65 MG/DL — SIGNIFICANT CHANGE UP (ref 0.5–1.3)
EGFR: 114 ML/MIN/1.73M2 — SIGNIFICANT CHANGE UP
EOSINOPHIL # BLD AUTO: 0.04 K/UL — SIGNIFICANT CHANGE UP (ref 0–0.5)
EOSINOPHIL NFR BLD AUTO: 0.5 % — SIGNIFICANT CHANGE UP (ref 0–6)
FIBRINOGEN PPP-MCNC: 734 MG/DL — HIGH (ref 330–520)
GLUCOSE SERPL-MCNC: 78 MG/DL — SIGNIFICANT CHANGE UP (ref 70–99)
HCT VFR BLD CALC: 33.5 % — LOW (ref 34.5–45)
HGB BLD-MCNC: 11.1 G/DL — LOW (ref 11.5–15.5)
IANC: 5.14 K/UL — SIGNIFICANT CHANGE UP (ref 1.8–7.4)
IMM GRANULOCYTES NFR BLD AUTO: 0.4 % — SIGNIFICANT CHANGE UP (ref 0–1.5)
INR BLD: 0.97 RATIO — SIGNIFICANT CHANGE UP (ref 0.88–1.16)
LDH SERPL L TO P-CCNC: 188 U/L — SIGNIFICANT CHANGE UP (ref 135–225)
LYMPHOCYTES # BLD AUTO: 1.98 K/UL — SIGNIFICANT CHANGE UP (ref 1–3.3)
LYMPHOCYTES # BLD AUTO: 25.5 % — SIGNIFICANT CHANGE UP (ref 13–44)
MCHC RBC-ENTMCNC: 28.9 PG — SIGNIFICANT CHANGE UP (ref 27–34)
MCHC RBC-ENTMCNC: 33.1 GM/DL — SIGNIFICANT CHANGE UP (ref 32–36)
MCV RBC AUTO: 87.2 FL — SIGNIFICANT CHANGE UP (ref 80–100)
MONOCYTES # BLD AUTO: 0.55 K/UL — SIGNIFICANT CHANGE UP (ref 0–0.9)
MONOCYTES NFR BLD AUTO: 7.1 % — SIGNIFICANT CHANGE UP (ref 2–14)
NEUTROPHILS # BLD AUTO: 5.14 K/UL — SIGNIFICANT CHANGE UP (ref 1.8–7.4)
NEUTROPHILS NFR BLD AUTO: 66.1 % — SIGNIFICANT CHANGE UP (ref 43–77)
NRBC # BLD: 0 /100 WBCS — SIGNIFICANT CHANGE UP (ref 0–0)
NRBC # FLD: 0 K/UL — SIGNIFICANT CHANGE UP (ref 0–0)
PLATELET # BLD AUTO: 180 K/UL — SIGNIFICANT CHANGE UP (ref 150–400)
POTASSIUM SERPL-MCNC: 3.7 MMOL/L — SIGNIFICANT CHANGE UP (ref 3.5–5.3)
POTASSIUM SERPL-SCNC: 3.7 MMOL/L — SIGNIFICANT CHANGE UP (ref 3.5–5.3)
PROT SERPL-MCNC: 6.8 G/DL — SIGNIFICANT CHANGE UP (ref 6–8.3)
PROTHROM AB SERPL-ACNC: 11.2 SEC — SIGNIFICANT CHANGE UP (ref 10.5–13.4)
RBC # BLD: 3.84 M/UL — SIGNIFICANT CHANGE UP (ref 3.8–5.2)
RBC # FLD: 13.9 % — SIGNIFICANT CHANGE UP (ref 10.3–14.5)
SARS-COV-2 IGG+IGM SERPL QL IA: >250 U/ML — HIGH
SARS-COV-2 IGG+IGM SERPL QL IA: POSITIVE
SODIUM SERPL-SCNC: 134 MMOL/L — LOW (ref 135–145)
T PALLIDUM AB TITR SER: NEGATIVE — SIGNIFICANT CHANGE UP
URATE SERPL-MCNC: 5.3 MG/DL — SIGNIFICANT CHANGE UP (ref 2.5–7)
WBC # BLD: 7.77 K/UL — SIGNIFICANT CHANGE UP (ref 3.8–10.5)
WBC # FLD AUTO: 7.77 K/UL — SIGNIFICANT CHANGE UP (ref 3.8–10.5)

## 2022-09-15 PROCEDURE — 88302 TISSUE EXAM BY PATHOLOGIST: CPT | Mod: 26

## 2022-09-15 DEVICE — ARISTA 3GR: Type: IMPLANTABLE DEVICE | Status: FUNCTIONAL

## 2022-09-15 RX ORDER — HYDROMORPHONE HYDROCHLORIDE 2 MG/ML
1 INJECTION INTRAMUSCULAR; INTRAVENOUS; SUBCUTANEOUS
Refills: 0 | Status: DISCONTINUED | OUTPATIENT
Start: 2022-09-15 | End: 2022-09-17

## 2022-09-15 RX ORDER — ACETAMINOPHEN 500 MG
975 TABLET ORAL
Refills: 0 | Status: DISCONTINUED | OUTPATIENT
Start: 2022-09-15 | End: 2022-09-17

## 2022-09-15 RX ORDER — TETANUS TOXOID, REDUCED DIPHTHERIA TOXOID AND ACELLULAR PERTUSSIS VACCINE, ADSORBED 5; 2.5; 8; 8; 2.5 [IU]/.5ML; [IU]/.5ML; UG/.5ML; UG/.5ML; UG/.5ML
0.5 SUSPENSION INTRAMUSCULAR ONCE
Refills: 0 | Status: DISCONTINUED | OUTPATIENT
Start: 2022-09-15 | End: 2022-09-17

## 2022-09-15 RX ORDER — OXYCODONE HYDROCHLORIDE 5 MG/1
10 TABLET ORAL
Refills: 0 | Status: DISCONTINUED | OUTPATIENT
Start: 2022-09-15 | End: 2022-09-17

## 2022-09-15 RX ORDER — SODIUM CHLORIDE 9 MG/ML
1000 INJECTION, SOLUTION INTRAVENOUS ONCE
Refills: 0 | Status: DISCONTINUED | OUTPATIENT
Start: 2022-09-15 | End: 2022-09-16

## 2022-09-15 RX ORDER — MORPHINE SULFATE 50 MG/1
0.1 CAPSULE, EXTENDED RELEASE ORAL ONCE
Refills: 0 | Status: DISCONTINUED | OUTPATIENT
Start: 2022-09-15 | End: 2022-09-16

## 2022-09-15 RX ORDER — SODIUM CHLORIDE 9 MG/ML
1000 INJECTION, SOLUTION INTRAVENOUS
Refills: 0 | Status: DISCONTINUED | OUTPATIENT
Start: 2022-09-15 | End: 2022-09-16

## 2022-09-15 RX ORDER — DIPHENHYDRAMINE HCL 50 MG
25 CAPSULE ORAL EVERY 6 HOURS
Refills: 0 | Status: DISCONTINUED | OUTPATIENT
Start: 2022-09-15 | End: 2022-09-17

## 2022-09-15 RX ORDER — SODIUM CHLORIDE 9 MG/ML
500 INJECTION, SOLUTION INTRAVENOUS ONCE
Refills: 0 | Status: DISCONTINUED | OUTPATIENT
Start: 2022-09-15 | End: 2022-09-16

## 2022-09-15 RX ORDER — SODIUM CHLORIDE 9 MG/ML
1000 INJECTION, SOLUTION INTRAVENOUS ONCE
Refills: 0 | Status: COMPLETED | OUTPATIENT
Start: 2022-09-15 | End: 2022-09-15

## 2022-09-15 RX ORDER — OXYCODONE HYDROCHLORIDE 5 MG/1
5 TABLET ORAL
Refills: 0 | Status: DISCONTINUED | OUTPATIENT
Start: 2022-09-15 | End: 2022-09-17

## 2022-09-15 RX ORDER — ONDANSETRON 8 MG/1
4 TABLET, FILM COATED ORAL EVERY 6 HOURS
Refills: 0 | Status: DISCONTINUED | OUTPATIENT
Start: 2022-09-15 | End: 2022-09-17

## 2022-09-15 RX ORDER — NALOXONE HYDROCHLORIDE 4 MG/.1ML
0.1 SPRAY NASAL
Refills: 0 | Status: DISCONTINUED | OUTPATIENT
Start: 2022-09-15 | End: 2022-09-17

## 2022-09-15 RX ORDER — SODIUM CHLORIDE 9 MG/ML
500 INJECTION, SOLUTION INTRAVENOUS ONCE
Refills: 0 | Status: DISCONTINUED | OUTPATIENT
Start: 2022-09-15 | End: 2022-09-17

## 2022-09-15 RX ORDER — KETOROLAC TROMETHAMINE 30 MG/ML
30 SYRINGE (ML) INJECTION EVERY 6 HOURS
Refills: 0 | Status: DISCONTINUED | OUTPATIENT
Start: 2022-09-15 | End: 2022-09-17

## 2022-09-15 RX ORDER — LANOLIN
1 OINTMENT (GRAM) TOPICAL EVERY 6 HOURS
Refills: 0 | Status: DISCONTINUED | OUTPATIENT
Start: 2022-09-15 | End: 2022-09-17

## 2022-09-15 RX ORDER — MAGNESIUM HYDROXIDE 400 MG/1
30 TABLET, CHEWABLE ORAL
Refills: 0 | Status: DISCONTINUED | OUTPATIENT
Start: 2022-09-15 | End: 2022-09-17

## 2022-09-15 RX ORDER — OXYTOCIN 10 UNIT/ML
333.33 VIAL (ML) INJECTION
Qty: 20 | Refills: 0 | Status: DISCONTINUED | OUTPATIENT
Start: 2022-09-15 | End: 2022-09-15

## 2022-09-15 RX ORDER — HEPARIN SODIUM 5000 [USP'U]/ML
5000 INJECTION INTRAVENOUS; SUBCUTANEOUS EVERY 12 HOURS
Refills: 0 | Status: DISCONTINUED | OUTPATIENT
Start: 2022-09-15 | End: 2022-09-17

## 2022-09-15 RX ORDER — FAMOTIDINE 10 MG/ML
20 INJECTION INTRAVENOUS ONCE
Refills: 0 | Status: COMPLETED | OUTPATIENT
Start: 2022-09-15 | End: 2022-09-15

## 2022-09-15 RX ORDER — CITRIC ACID/SODIUM CITRATE 300-500 MG
30 SOLUTION, ORAL ORAL ONCE
Refills: 0 | Status: COMPLETED | OUTPATIENT
Start: 2022-09-15 | End: 2022-09-15

## 2022-09-15 RX ORDER — SODIUM CHLORIDE 9 MG/ML
1000 INJECTION, SOLUTION INTRAVENOUS
Refills: 0 | Status: DISCONTINUED | OUTPATIENT
Start: 2022-09-15 | End: 2022-09-15

## 2022-09-15 RX ORDER — OXYCODONE HYDROCHLORIDE 5 MG/1
5 TABLET ORAL ONCE
Refills: 0 | Status: DISCONTINUED | OUTPATIENT
Start: 2022-09-15 | End: 2022-09-17

## 2022-09-15 RX ORDER — SIMETHICONE 80 MG/1
80 TABLET, CHEWABLE ORAL EVERY 4 HOURS
Refills: 0 | Status: DISCONTINUED | OUTPATIENT
Start: 2022-09-15 | End: 2022-09-17

## 2022-09-15 RX ORDER — IBUPROFEN 200 MG
600 TABLET ORAL EVERY 6 HOURS
Refills: 0 | Status: COMPLETED | OUTPATIENT
Start: 2022-09-15 | End: 2023-08-14

## 2022-09-15 RX ORDER — SODIUM CHLORIDE 9 MG/ML
1000 INJECTION, SOLUTION INTRAVENOUS ONCE
Refills: 0 | Status: DISCONTINUED | OUTPATIENT
Start: 2022-09-15 | End: 2022-09-17

## 2022-09-15 RX ORDER — ACETAMINOPHEN 500 MG
1000 TABLET ORAL ONCE
Refills: 0 | Status: COMPLETED | OUTPATIENT
Start: 2022-09-15 | End: 2022-09-15

## 2022-09-15 RX ADMIN — Medication 975 MILLIGRAM(S): at 15:17

## 2022-09-15 RX ADMIN — SODIUM CHLORIDE 2000 MILLILITER(S): 9 INJECTION, SOLUTION INTRAVENOUS at 04:50

## 2022-09-15 RX ADMIN — Medication 1000 MILLIUNIT(S)/MIN: at 08:02

## 2022-09-15 RX ADMIN — Medication 30 MILLIGRAM(S): at 08:06

## 2022-09-15 RX ADMIN — Medication 30 MILLILITER(S): at 04:50

## 2022-09-15 RX ADMIN — Medication 30 MILLIGRAM(S): at 17:44

## 2022-09-15 RX ADMIN — HEPARIN SODIUM 5000 UNIT(S): 5000 INJECTION INTRAVENOUS; SUBCUTANEOUS at 17:44

## 2022-09-15 RX ADMIN — Medication 975 MILLIGRAM(S): at 21:09

## 2022-09-15 RX ADMIN — SODIUM CHLORIDE 75 MILLILITER(S): 9 INJECTION, SOLUTION INTRAVENOUS at 09:32

## 2022-09-15 RX ADMIN — FAMOTIDINE 20 MILLIGRAM(S): 10 INJECTION INTRAVENOUS at 04:50

## 2022-09-15 RX ADMIN — Medication 400 MILLIGRAM(S): at 09:42

## 2022-09-15 RX ADMIN — Medication 975 MILLIGRAM(S): at 22:05

## 2022-09-15 RX ADMIN — SODIUM CHLORIDE 125 MILLILITER(S): 9 INJECTION, SOLUTION INTRAVENOUS at 15:18

## 2022-09-15 NOTE — OB NEONATOLOGY/PEDIATRICIAN DELIVERY SUMMARY - NSPEDSNEONOTESA_OBGYN_ALL_OB_FT
Pediatrician called to delivery for vacuum-assisted , arrived at 3 mins of life. Male infant born at 37 2/7 wks via vacuum-assisted  to a 39 y/o  blood type O+ mother. No significant maternal or prenatal history. Prenatal labs nr/immune/-, GBS - on 9/3. Covid neg. AROM at delivery with clear fluids. EOS score not applicable, highest maternal temperature 36.6. Baby emerged vigorous, crying. Infant was brought to radiant warmer and warmed, dried, stimulated and suctioned. HR>100, normal respiratory effort. APGARS of 9/9. Consents to Hepatitis B vaccination. Declines for infant to be circumcised. Pediatrician is Dr. Moreno.     BW: 3580g  : 9/15/[birth year]  TOB: 0634

## 2022-09-15 NOTE — OB PROVIDER DELIVERY SUMMARY - NSPROVIDERDELIVERYNOTE_OBGYN_ALL_OB_FT
unscheduled non urgent rLTCS for PEC, lysis of adhesions, and BTL   Viable male infant. 7lbs 14oz  APGARS 9/9.   Hysterotomy closed in double layer using caprosyn  Clara placed atop hysterotomy. Bladder backfilled with normal saline. No defect identified.   Grossly normal uterus, tubes, and ovaries. B/L fallopian tubes removed via Ligasure w/ excellent hemostatsis noted.  Abdomen closed in standard fashion  Pt and infant to recovery in stable condition  EBL: 344  IVF: 1500  UOP: 100    Dictation: [id number]  Chaparrita Cardenas, PGY-2 unscheduled non urgent rLTCS for PEC, lysis of adhesions, and Bilateral salpingectomy for sterilization   Viable male infant. 7lbs 14oz  APGARS 9/9.   Hysterotomy closed in two layers using  heavy Caprosyn suture   Clara placed atop hysterotomy. Bladder backfilled with normal saline. No defect identified.   Grossly normal uterus, tubes, and ovaries. B/L fallopian tubes removed via Ligasure w/ excellent hemostasis noted.  Abdomen closed in standard fashion  Patient and infant to recovery in stable condition  EBL: 344  IVF: 1500  UOP: 100    Dictation: [id number]  Chaparrita Cardenas, PGY-2

## 2022-09-15 NOTE — OB PROVIDER DELIVERY SUMMARY - NSSELHIDDEN_OBGYN_ALL_OB_FT
[NS_DeliveryAttending1_OBGYN_ALL_OB_FT:[email or web address]],[NS_DeliveryRN_OBGYN_ALL_OB_FT:[email or web address]=],[NS_DeliveryAssist2_OBGYN_ALL_OB_FT:[id number]=]

## 2022-09-15 NOTE — OB RN DELIVERY SUMMARY - NS_SEPSISRSKCALC_OBGYN_ALL_OB_FT
EOS calculated successfully. EOS Risk Factor: 0.5/1000 live births ([facility] [place] national incidence); GA=37w3d; Temp=98.1; ROM=0; GBS='Negative'; Antibiotics='No antibiotics or any antibiotics < 2 hrs prior to birth'

## 2022-09-15 NOTE — OB RN INTRAOPERATIVE NOTE - NSSELHIDDEN_OBGYN_ALL_OB_FT
[NS_DeliveryAttending1_OBGYN_ALL_OB_FT:[email or web address]],[NS_DeliveryRN_OBGYN_ALL_OB_FT:[email or web address]=]

## 2022-09-16 LAB
BASOPHILS # BLD AUTO: 0.03 K/UL — SIGNIFICANT CHANGE UP (ref 0–0.2)
BASOPHILS NFR BLD AUTO: 0.2 % — SIGNIFICANT CHANGE UP (ref 0–2)
EOSINOPHIL # BLD AUTO: 0.03 K/UL — SIGNIFICANT CHANGE UP (ref 0–0.5)
EOSINOPHIL NFR BLD AUTO: 0.2 % — SIGNIFICANT CHANGE UP (ref 0–6)
HCT VFR BLD CALC: 29.7 % — LOW (ref 34.5–45)
HGB BLD-MCNC: 9.6 G/DL — LOW (ref 11.5–15.5)
IANC: 9.39 K/UL — HIGH (ref 1.8–7.4)
IMM GRANULOCYTES NFR BLD AUTO: 0.6 % — SIGNIFICANT CHANGE UP (ref 0–0.9)
LYMPHOCYTES # BLD AUTO: 16.4 % — SIGNIFICANT CHANGE UP (ref 13–44)
LYMPHOCYTES # BLD AUTO: 2.01 K/UL — SIGNIFICANT CHANGE UP (ref 1–3.3)
MCHC RBC-ENTMCNC: 28.5 PG — SIGNIFICANT CHANGE UP (ref 27–34)
MCHC RBC-ENTMCNC: 32.3 GM/DL — SIGNIFICANT CHANGE UP (ref 32–36)
MCV RBC AUTO: 88.1 FL — SIGNIFICANT CHANGE UP (ref 80–100)
MONOCYTES # BLD AUTO: 0.72 K/UL — SIGNIFICANT CHANGE UP (ref 0–0.9)
MONOCYTES NFR BLD AUTO: 5.9 % — SIGNIFICANT CHANGE UP (ref 2–14)
NEUTROPHILS # BLD AUTO: 9.39 K/UL — HIGH (ref 1.8–7.4)
NEUTROPHILS NFR BLD AUTO: 76.7 % — SIGNIFICANT CHANGE UP (ref 43–77)
NRBC # BLD: 0 /100 WBCS — SIGNIFICANT CHANGE UP (ref 0–0)
NRBC # FLD: 0 K/UL — SIGNIFICANT CHANGE UP (ref 0–0)
PLATELET # BLD AUTO: 169 K/UL — SIGNIFICANT CHANGE UP (ref 150–400)
RBC # BLD: 3.37 M/UL — LOW (ref 3.8–5.2)
RBC # FLD: 13.8 % — SIGNIFICANT CHANGE UP (ref 10.3–14.5)
WBC # BLD: 12.25 K/UL — HIGH (ref 3.8–10.5)
WBC # FLD AUTO: 12.25 K/UL — HIGH (ref 3.8–10.5)

## 2022-09-16 RX ORDER — SENNA PLUS 8.6 MG/1
1 TABLET ORAL
Refills: 0 | Status: DISCONTINUED | OUTPATIENT
Start: 2022-09-16 | End: 2022-09-17

## 2022-09-16 RX ORDER — IBUPROFEN 200 MG
600 TABLET ORAL EVERY 6 HOURS
Refills: 0 | Status: DISCONTINUED | OUTPATIENT
Start: 2022-09-16 | End: 2022-09-17

## 2022-09-16 RX ORDER — ASCORBIC ACID 60 MG
500 TABLET,CHEWABLE ORAL DAILY
Refills: 0 | Status: DISCONTINUED | OUTPATIENT
Start: 2022-09-16 | End: 2022-09-17

## 2022-09-16 RX ORDER — FERROUS SULFATE 325(65) MG
325 TABLET ORAL THREE TIMES A DAY
Refills: 0 | Status: DISCONTINUED | OUTPATIENT
Start: 2022-09-16 | End: 2022-09-17

## 2022-09-16 RX ADMIN — Medication 325 MILLIGRAM(S): at 23:05

## 2022-09-16 RX ADMIN — Medication 30 MILLIGRAM(S): at 12:20

## 2022-09-16 RX ADMIN — MAGNESIUM HYDROXIDE 30 MILLILITER(S): 400 TABLET, CHEWABLE ORAL at 15:39

## 2022-09-16 RX ADMIN — SIMETHICONE 80 MILLIGRAM(S): 80 TABLET, CHEWABLE ORAL at 08:57

## 2022-09-16 RX ADMIN — Medication 30 MILLIGRAM(S): at 18:13

## 2022-09-16 RX ADMIN — Medication 30 MILLIGRAM(S): at 12:05

## 2022-09-16 RX ADMIN — Medication 30 MILLIGRAM(S): at 17:27

## 2022-09-16 RX ADMIN — Medication 975 MILLIGRAM(S): at 09:35

## 2022-09-16 RX ADMIN — Medication 30 MILLIGRAM(S): at 06:22

## 2022-09-16 RX ADMIN — Medication 975 MILLIGRAM(S): at 08:56

## 2022-09-16 RX ADMIN — Medication 975 MILLIGRAM(S): at 16:20

## 2022-09-16 RX ADMIN — Medication 975 MILLIGRAM(S): at 23:05

## 2022-09-16 RX ADMIN — Medication 975 MILLIGRAM(S): at 15:37

## 2022-09-16 RX ADMIN — Medication 30 MILLIGRAM(S): at 00:13

## 2022-09-16 RX ADMIN — HEPARIN SODIUM 5000 UNIT(S): 5000 INJECTION INTRAVENOUS; SUBCUTANEOUS at 17:27

## 2022-09-16 RX ADMIN — Medication 975 MILLIGRAM(S): at 23:35

## 2022-09-16 RX ADMIN — Medication 30 MILLIGRAM(S): at 01:08

## 2022-09-16 RX ADMIN — Medication 30 MILLIGRAM(S): at 06:07

## 2022-09-16 RX ADMIN — HEPARIN SODIUM 5000 UNIT(S): 5000 INJECTION INTRAVENOUS; SUBCUTANEOUS at 06:07

## 2022-09-16 NOTE — OB PROVIDER LABOR PROGRESS NOTE - NS_ADDCERVICALEXAM_OBGYN_ALL_OB
[address] [address]  INPATIENT PHYSICAL THERAPY  EVALUATION  [facility] CVICU 4B - 4B-05/005-A    Time In: 7832  Time Out: 1230  Timed Code Treatment Minutes: 10 Minutes  Minutes: 36          Date: 2022  Patient Name: Leonel Salcedo,  Gender:  female        MRN: [id number]  : 1939  (81 y.o.)      Referring Practitioner: Sudhir Mclean MD  Diagnosis: Mitral leaflet abnormality  Additional Pertinent Hx: Patient presented to [facility] on  for MitraClip procedure due to severe mitral regurgitation noted on previous diagnostic cath in 2022. During procedure, massive hemorrhagic bleed noted that led to Cardiac Tamponade. At that time, the patient became hypotensive (SBP in 40's), Tachycardic ('s), and respiratory status decreased. Patient presented to ICU at this time, patient was in need of massive transfusion. Levo and Epi started pericardial drain was placed and atrium placed at -40, later adjusted to -10 with better results. Patient noted to have left ventricular dysfunction with low EF. Patient to return back to Cath lab for planned transition to intra-aortic balloon pump to augment support. Balloon pump removed and pt extubated 9/15. Restrictions/Precautions:  Restrictions/Precautions: Fall Risk  Position Activity Restriction  Other position/activity restrictions: R groin Woggle stitch- cardiology to clarify activity     Subjective:  Chart Reviewed: Yes  Patient assessed for rehabilitation services?: Yes  Family / Caregiver Present: No  Subjective: RN approved session, pt on bed pan upon arrival to room. After pericare, pt noted to have R groin Woggle stitch in place. Per chart review, it is noted as \"Right femoral puncture\". Message sent to cardiology to clarify activity with stitch in place, Murray Rai responded with \"Isha will see and update\". Will follow up after cardiology rounds. Discussed with RN.     General:  Overall Orientation Status: Within Functional Limits  Vision: Within Functional Limits  Hearing: Exceptions to [facility]  Hearing Exceptions: Hard of hearing/hearing concerns       Pain: denies    Vitals: Blood Pressure: 109/51  Oxygen: 92% on 3 L  Heart Rate: 84  **All at rest    Social/Functional History:    Lives With: Other (comment) (Female cousin)  Type of Home: [facility]  Home Layout: One level  Home Access: Level entry  Home Equipment:  (None)     ADL Assistance: Independent  Homemaking Assistance: Independent  Ambulation Assistance: Independent  Transfer Assistance: Independent    Active : Yes  Mode of Transportation: Car  Occupation: Retired  Additional Comments: Pt fully I prior to admission    OBJECTIVE:  Range of Motion:  Left Lower Extremity: WFL  Bilateral Upper Extremity:  Impaired - B shoulder flexion to ~90 degrees, R ankle ROM WFL, R hip NT due to stitch    Bed Mobility:  Rolling to Right: Moderate Assistance, X 1, with head of bed flat, with rail ; to remove bed pan and for pericare; pt noted to have Woggle stitch following this activity; pt advised to remain flat in bed with no R hip movement until clarified by cardiology    Transfers:  **Further mobility held due to awaiting activity clarification    Exercise:  Patient was guided in 1 set(s) 10 reps of exercise to both lower extremities. Ankle pumps, Quad sets, L heelslides, B shoulder flexion to ~90 degrees and Shoulder horizontal abduction/adduction. Exercises were completed for increased independence with functional mobility. Functional Outcome Measures: Completed  AM-PAC Inpatient Mobility without Stair Climbing Raw Score : 6  AM-PAC Inpatient without Stair Climbing T-Scale Score : 26.48    ASSESSMENT:  Activity Tolerance:  Patient tolerance of  treatment: good. Treatment Initiated: Treatment and education initiated within context of evaluation.   Evaluation time included review of current medical information, gathering information related to past medical, social and functional Click to add… history, completion of standardized testing, formal and informal observation of tasks, assessment of data and development of plan of care and goals. Treatment time included skilled education and facilitation of tasks to increase safety and independence with functional mobility for improved independence and quality of life. Assessment: Body Structures, Functions, Activity Limitations Requiring Skilled Therapeutic Intervention: Decreased functional mobility , Decreased endurance, Decreased strength  Assessment: Pt assisted off bed pan upon arrival to room, performs UE exercise and LLE therex but further mobility held until activity clarified by cardiology. PT to continue to progress strength and functional mobility. Therapy Prognosis: Good    Requires PT Follow-Up: Yes    Discharge Recommendations:  Discharge Recommendations: Continue to assess pending progress, Patient would benefit from continued therapy after discharge    Patient Education:      . Patient Education  Education Given To: Patient  Education Provided: Role of Therapy, Plan of Care, Precautions  Education Method: Verbal  Barriers to Learning: None  Education Outcome: Verbalized understanding       Equipment Recommendations: Other: will follow for needs    Plan:  Current Treatment Recommendations: Strengthening, Patient/Caregiver education & training  Plan:  (5x GM)    Goals:  Patient goals : to get better  Short Term Goals  Time Frame for Short term goals: by discharge  Short term goal 1: PT to assess mobility once activity clarified by cardiology. Long Term Goals  Time Frame for Long term goals : NA due to short length of stay. Following session, patient left in safe position with all fall risk precautions in place.

## 2022-09-17 ENCOUNTER — TRANSCRIPTION ENCOUNTER (OUTPATIENT)
Age: 42
End: 2022-09-17

## 2022-09-17 VITALS
DIASTOLIC BLOOD PRESSURE: 82 MMHG | SYSTOLIC BLOOD PRESSURE: 118 MMHG | TEMPERATURE: 99 F | RESPIRATION RATE: 18 BRPM | HEART RATE: 70 BPM | OXYGEN SATURATION: 100 %

## 2022-09-17 RX ORDER — IBUPROFEN 200 MG
1 TABLET ORAL
Qty: 30 | Refills: 0
Start: 2022-09-17

## 2022-09-17 RX ORDER — IBUPROFEN 200 MG
1 TABLET ORAL
Qty: 0 | Refills: 0 | DISCHARGE
Start: 2022-09-17

## 2022-09-17 RX ORDER — ACETAMINOPHEN 500 MG
3 TABLET ORAL
Qty: 0 | Refills: 0 | DISCHARGE
Start: 2022-09-17

## 2022-09-17 RX ADMIN — Medication 325 MILLIGRAM(S): at 11:35

## 2022-09-17 RX ADMIN — Medication 975 MILLIGRAM(S): at 10:10

## 2022-09-17 RX ADMIN — Medication 600 MILLIGRAM(S): at 12:30

## 2022-09-17 RX ADMIN — Medication 325 MILLIGRAM(S): at 06:27

## 2022-09-17 RX ADMIN — Medication 975 MILLIGRAM(S): at 09:12

## 2022-09-17 RX ADMIN — Medication 500 MILLIGRAM(S): at 11:35

## 2022-09-17 RX ADMIN — Medication 600 MILLIGRAM(S): at 06:21

## 2022-09-17 RX ADMIN — Medication 600 MILLIGRAM(S): at 07:00

## 2022-09-17 RX ADMIN — Medication 1 TABLET(S): at 11:35

## 2022-09-17 RX ADMIN — HEPARIN SODIUM 5000 UNIT(S): 5000 INJECTION INTRAVENOUS; SUBCUTANEOUS at 06:20

## 2022-09-17 RX ADMIN — Medication 600 MILLIGRAM(S): at 11:35

## 2022-09-17 NOTE — DISCHARGE NOTE OB - PLAN OF CARE
Nephrology Daily Progress Note     Mario Arshad  Date of Service: 4/2/2018        RECENT EVENTS / SUBJECTIVE:     In bedside chair  Awake and alert, tracking   Occasionally says \"yup\"  Minimal verbal responses  No acute events overnight   Tube feed and dago drain              PMHx, Social Hx , Medications and Allergies reviewed.      ALLERGIES:   Allergen Reactions   • Penicillins RASH       OBJECTIVE:    Vital signs over past 48 Hours:  Vital Last Value 24 Hour Range   Temp 97.8 °F (36.6 °C) (04/02/18 0524) Temp  Min: 97.8 °F (36.6 °C)  Max: 98.2 °F (36.8 °C)   Pulse 65 (04/02/18 0524) Pulse  Min: 60  Max: 65   Respiratory 18 (04/02/18 0524) Resp  Min: 18  Max: 18   Non-Invasive  Blood Pressure 139/73 (04/02/18 0524) BP  Min: 139/73  Max: 157/76   Arterial  Blood Pressure   No Data Recorded   Pulse Ox 97 % (04/02/18 0524) SpO2  Min: 97 %  Max: 98 %     Ht/Wt Today Admitted   Weight 71.1 kg 78.2 kg   Height  5' 11\" (180.3 cm)   BMI 21.91 24.1       Weight over past 48 Hours:  Patient Vitals for the past 48 hrs:   Weight   04/01/18 0425 71.2 kg   04/02/18 0524 71.1 kg     Weight change: -0.1 kg    Intake/Output this shift:  No intake/output data recorded.    Intake/Output Last 3 Shifts:  I/O last 3 completed shifts:  In: 2222 [NG/GT:2222]  Out: 150 [Drains:150]    Vent settings for last 24 hours:       Hemodynamic parameters for last 24 hours:       Medications / Infusions  Scheduled:   • cholecalciferol  1,000 Units PEG Tube Daily   • famotidine  20 mg PEG Tube 2 times per day   • nystatin  500,000 Units Swish & Spit 4x Daily   • atorvastatin  10 mg PEG Tube Nightly   • carvedilol  25 mg PEG Tube BID WC   • docusate sodium-sennosides  2 tablet PEG Tube Nightly   • polyethylene glycol  17 g PEG Tube Daily   • vitamin - therapeutic multivitamins w/minerals  1 tablet PEG Tube Daily   • heparin (porcine)  5,000 Units Subcutaneous 3 times per day   • chlorhexidine gluconate  15 mL Swish & Spit 2 times per day   •  insulin lispro   Subcutaneous 4 times per day        Continuous Infusions:   • sodium chloride 0.9% infusion     • dextrose 5 % infusion          PRN:   magnesium sulfate, magnesium sulfate, magnesium sulfate, sodium chloride, diphenhydrAMINE, acetaminophen, acetaminophen, calcium carbonate, potassium chloride, potassium chloride, fentaNYL, sodium chloride, albuterol-ipratropium 2.5 mg/0.5 mg, dextrose, dextrose, glucagon, dextrose, bisacodyl, ondansetron, potassium chloride, potassium chloride, potassium chloride, potassium chloride, hydrALAZINE, labetalol         Physical Exam  Gen  NAD  ENT  MMM  Neck supple, no JVD with trachea midline  Cardiac  RRR, S1, S2 no S3  Resp  Clear to auscultation, diminished bases bilaterally.  ABD  Soft , non tender, dago tube, G-tube  Ext  Trace LE edema   Neuro  Aphasic       Laboratory Results       Recent Labs  Lab 04/02/18  0444 04/01/18  1212 03/31/18  0438 03/30/18  0433 03/29/18  1317  03/27/18  0733   SODIUM 137  --  137 134* 135  --  140   POTASSIUM 3.8 4.3 3.8 3.9 4.6  < > 5.3*   CHLORIDE 101  --  101 101 102  --  103   CO2 29  --  27 26 26  --  32   BUN 21*  --  25* 23* 21*  --  16   CREATININE 0.60*  --  0.65* 0.65* 0.64*  --  0.66*   GLUCOSE 105*  --  102* 107* 110*  --  94   CALCIUM 8.5  --  8.3* 8.3* 8.3*  --  8.5   MG  --   --   --   --   --   --  1.9   PHOS  --   --   --  4.3  --   --   --    GFRNA >90  --  >90 >90 >90  --  >90   GFRA >90  --  >90 >90 >90  --  >90   ALBUMIN  --   --   --   --  2.5*  --   --    AST  --   --   --   --  27  --   --    GPT  --   --   --   --  26  --   --    ALKPT  --   --   --   --  154*  --   --    BILIRUBIN  --   --   --   --  0.8  --   --    < > = values in this interval not displayed.    Anemia    Recent Labs  Lab 03/26/18  1138   WBC 6.6   HGB 7.9*   HCT 24.8*          Recent Labs  Lab 03/26/18  1138   FERR 443*   IRON 65   PST 33        Mineral & Bone Disorder    Recent Labs  Lab 04/02/18  0444 03/31/18  0438  03/30/18  0433  03/27/18  0733   CALCIUM 8.5 8.3* 8.3*  < > 8.5   PHOS  --   --  4.3  --   --    MG  --   --   --   --  1.9   < > = values in this interval not displayed.   No results found for: PTH    Urine Panel  Lab Results   Component Value Date    UKET NEGATIVE 03/17/2018    USPG 1.016 03/17/2018    UPROT TRACE (A) 03/17/2018    UWBC NEGATIVE 03/17/2018    URBC NEGATIVE 03/17/2018    UBILI NEGATIVE 03/17/2018    UPH 5.5 03/17/2018    UROB 0.2 03/17/2018    UBACTR NONE SEEN 03/09/2018          Assessment / Plan     Acute Kidney Injury, non-oliguric, multifactorial, sepsis, +/- ATN.  Renal US normal.  Urine with proteinuria    -Avoid nephrotoxins, NSAIDs, Fleets, ACEi/ARB   -Pharmacy to ensure medications adjusted for reduced CrCl    -Gentle hydration   -Strict I&O, Daily weights    Hypertension, blood pressures controlled    Anemia, in post-op setting, Hgb stable   -Iron panel (03/26): Fe 65, Sat 33%, TIBC 198, Ferritin 443   -PRBCs per primary / GI following     Electrolytes   +Hypocalcemia, corrected calcium for hypoalbuminemia is NL   +Hypernatremia, improved, FWF   +Hyperkalemia, multifactorial, in setting of heparin SQ and beta blockers    Proteinuria, quantified as 1.4g on UPCR (03/09)    Vitamin D deficiency, cholecalciferol daily     Acute Ischemic Stroke, per neurology         Recs  Potassium improved, monitor, may require supplement   On renal tube feed  Creatinine and sodium have been stable  Will monitor labs, see 3x/weekly      TOVA Kuhn  4/2/2018  Nephrology    APNP available by pager today 8:00am to 4:30pm at [phone]  Use answering service [phone] after 4:30pm, before 8am, and weekends for on-call provider     s/p  delivery.  nothing per vagina x 6 weeks, follow-up for blood pressure and incision check in 1 week, full post partum visit in 6 weeks.

## 2022-09-17 NOTE — DISCHARGE NOTE OB - MEDICATION SUMMARY - MEDICATIONS TO TAKE
I will START or STAY ON the medications listed below when I get home from the hospital:    acetaminophen 325 mg oral tablet  -- 3 tab(s) by mouth every 6 hours  -- Indication: For Pain    ibuprofen 600 mg oral tablet  -- 1 tab(s) by mouth every 6 hours  -- Indication: For Pain    PNV Prenatal oral tablet  -- 1 tab(s) by mouth once a day  -- Indication: For supplement   I will START or STAY ON the medications listed below when I get home from the hospital:    acetaminophen 325 mg oral tablet  -- 3 tab(s) by mouth every 6 hours  -- Indication: For Pain    ibuprofen 600 mg oral tablet  -- 1 tab(s) by mouth every 6 hours  -- Indication: For Pain    ibuprofen 600 mg oral tablet  -- 1 tab(s) by mouth every 6 hours, As Needed -for moderate pain MDD:4   -- Do not take this drug if you are pregnant.  It is very important that you take or use this exactly as directed.  Do not skip doses or discontinue unless directed by your doctor.  May cause drowsiness or dizziness.  Obtain medical advice before taking any non-prescription drugs as some may affect the action of this medication.  Take with food or milk.    -- Indication: For Pain    PNV Prenatal oral tablet  -- 1 tab(s) by mouth once a day  -- Indication: For supplement

## 2022-09-17 NOTE — DISCHARGE NOTE OB - HOSPITAL COURSE
Admitted for pre-eclampsia without severe features.  h/o previous C/S.  Vacuum assisted LTCS with tubal sterilization 9/15/22, viable male infant.  Postpartum course uncomplicated.

## 2022-09-17 NOTE — PROGRESS NOTE ADULT - SUBJECTIVE AND OBJECTIVE BOX
Acute OB Pain Service and Post-op Note    POD#1 S/P  Section    Pt is doing well.  Pt is awake and alert.    THERAPY:  [X] Spinal morphine   [  ] Epidural morphine   [  ] IV PCA Hydromorphone 1 mg/ml    Side Effects: [X] None    [ ] Nausea     [ ] Pruritis     [ ] Weakness    [ ] Numbness    Pain Scale Score at rest: 3 /10    & with activity:   4/10    The patient received Hydromorphone 100 mcg via intrathecal injection on 9/15/22.    Intrathecal injection site is: without erythema, discharge, edema, and is nontender.    The patient is afebrile, alert and oriented    Side Effects: No PONV, No Pruritis, No Numbness, No Neuromuscular deficits noted.  Continue prn p.o. analgesics  
S: 42yo POD#2 s/p Vaccuum assist pLTCS w/BTL.  c/b PEC . Pain is well controlled. She is tolerating a regular diet and passing flatus. She is voiding spontaneously, and ambulating without difficulty. Denies: Headaches, visual changes, RUQ pain, CP/SOB. Denies lightheadedness/dizziness. Denies N/V.    O:  Vitals:  Vital Signs Last 24 Hrs  T(C): 36.6 (17 Sep 2022 05:45), Max: 36.9 (16 Sep 2022 10:00)  T(F): 97.9 (17 Sep 2022 05:45), Max: 98.4 (16 Sep 2022 10:00)  HR: 71 (17 Sep 2022 05:45) (61 - 77)  BP: 123/68 (17 Sep 2022 05:45) (110/65 - 132/79)  BP(mean): --  RR: 18 (17 Sep 2022 05:45) (18 - 18)  SpO2: 97% (17 Sep 2022 05:45) (97% - 100%)    Parameters below as of 16 Sep 2022 13:59  Patient On (Oxygen Delivery Method): room air        MEDICATIONS  (STANDING):  acetaminophen     Tablet .. 975 milliGRAM(s) Oral <User Schedule>  ascorbic acid 500 milliGRAM(s) Oral daily  diphtheria/tetanus/pertussis (acellular) Vaccine (ADAcel) 0.5 milliLiter(s) IntraMuscular once  ferrous    sulfate 325 milliGRAM(s) Oral three times a day  heparin   Injectable 5000 Unit(s) SubCutaneous every 12 hours  ibuprofen  Tablet. 600 milliGRAM(s) Oral every 6 hours  influenza   Vaccine 0.5 milliLiter(s) IntraMuscular once  lactated ringers Bolus 500 milliLiter(s) IV Bolus once  lactated ringers Bolus 1000 milliLiter(s) IV Bolus once  prenatal multivitamin 1 Tablet(s) Oral daily  senna 1 Tablet(s) Oral two times a day      MEDICATIONS  (PRN):  diphenhydrAMINE 25 milliGRAM(s) Oral every 6 hours PRN Pruritus  HYDROmorphone  Injectable 1 milliGRAM(s) IV Push every 3 hours PRN Severe Pain (7 - 10)  lanolin Ointment 1 Application(s) Topical every 6 hours PRN Sore Nipples  magnesium hydroxide Suspension 30 milliLiter(s) Oral two times a day PRN Constipation  naloxone Injectable 0.1 milliGRAM(s) IV Push every 3 minutes PRN For ANY of the following changes in patient status:  A. RR LESS THAN 10 breaths per minute, B. Oxygen saturation LESS THAN 90%, C. Sedation score of 6  ondansetron Injectable 4 milliGRAM(s) IV Push every 6 hours PRN Nausea  oxyCODONE    IR 5 milliGRAM(s) Oral every 3 hours PRN Moderate to Severe Pain (4-10)  oxyCODONE    IR 5 milliGRAM(s) Oral once PRN Moderate to Severe Pain (4-10)  oxyCODONE    IR 5 milliGRAM(s) Oral every 3 hours PRN Mild Pain (1 - 3)  oxyCODONE    IR 10 milliGRAM(s) Oral every 3 hours PRN Moderate Pain (4 - 6)  simethicone 80 milliGRAM(s) Chew every 4 hours PRN Gas      Labs:  Blood type: O Positive  Rubella IgG: RPR: Negative                          9.6<L>   12.25<H> >-----------< 169    ( 09-16 @ 06:21 )             29.7<L>                        11.1<L>   7.77 >-----------< 180    ( 09-15 @ 03:09 )             33.5<L>                        11.1<L>   6.84 >-----------< 181    ( 09-14 @ 17:50 )             32.7<L>    09-15-22 @ 03:09      134<L>  |  103  |  10  ----------------------------<  78  3.7   |  22  |  0.65    09-14-22 @ 17:50      135  |  105  |  12  ----------------------------<  82  4.2   |  20<L>  |  0.55        Ca    8.7      15 Sep 2022 03:09  Ca    8.9      14 Sep 2022 17:50    TPro  6.8  /  Alb  3.2<L>  /  TBili  0.4  /  DBili  x   /  AST  21  /  ALT  23  /  AlkPhos  224<H>  09-15-22 @ 03:09  TPro  6.8  /  Alb  3.4  /  TBili  0.3  /  DBili  x   /  AST  26  /  ALT  27  /  AlkPhos  231<H>  09-14-22 @ 17:50          PE:  General: NAD, Alert, Ox4  Abdomen: Soft, appropriately tender, incision c/d/i.  ; Lochia, wnl, light  Extremities: No erythema, no pitting edema, No calf tenderness    A/P: 42yo  POD#2 s/p Vaccuum assist  pLTCS & BTL. c/b: PEC.  Patient is stable and doing well post-operatively.     -Continue Motrin, Tylenol oxycodone PRN for pain control.   - Continue regular diet/ Hydration  - Increase ambulation.  -Incisional care reviewed/ S&S of infection was d/w pt.  -Post Partum care reviewed w/pt.  -Instructed to f/u in 6 weeks for PP checkup  - pt. verbalized having BP  CUFF @ home / script declined  - PEC S&S was explained to Pt./  verbalized understanding   -PEC parameters explained :140/90 call OB or if 160/110 return to hospital            Sherry Adrian NP
S: 40yo POD#1 s/p repeat vacuum assisted LTCS with lysis of adhesion & BTL. OBHX: PEC this pregnancy P/C 0.5.  Pain is well controlled. She is tolerating a regular diet and passing flatus. Denies N/V. Denies CP/SOB/lightheadedness/dizziness. She is ambulating without difficulty and has voided.       O:   Vital Signs Last 24 Hrs  T(C): 36.8 (16 Sep 2022 06:05), Max: 37.1 (15 Sep 2022 18:42)  T(F): 98.2 (16 Sep 2022 06:05), Max: 98.7 (15 Sep 2022 18:42)  HR: 70 (16 Sep 2022 06:05) (62 - 70)  BP: 109/64 (16 Sep 2022 06:05) (97/61 - 126/73)  BP(mean): 81 (15 Sep 2022 10:00) (80 - 86)  RR: 18 (16 Sep 2022 06:05) (14 - 18)  SpO2: 100% (16 Sep 2022 06:05) (96% - 100%)    Parameters below as of 15 Sep 2022 14:00  Patient On (Oxygen Delivery Method): room air        Labs:  Blood type: O Positive  Rubella IgG: RPR: Negative                          9.6<L>   12.25<H> >-----------< 169    ( 09-16 @ 06:21 )             29.7<L>                        11.1<L>   7.77 >-----------< 180    ( 09-15 @ 03:09 )             33.5<L>                        11.1<L>   6.84 >-----------< 181    ( 09-14 @ 17:50 )             32.7<L>    09-15-22 @ 03:09      134<L>  |  103  |  10  ----------------------------<  78  3.7   |  22  |  0.65    09-14-22 @ 17:50      135  |  105  |  12  ----------------------------<  82  4.2   |  20<L>  |  0.55        Ca    8.7      15 Sep 2022 03:09  Ca    8.9      14 Sep 2022 17:50    TPro  6.8  /  Alb  3.2<L>  /  TBili  0.4  /  DBili  x   /  AST  21  /  ALT  23  /  AlkPhos  224<H>  09-15-22 @ 03:09  TPro  6.8  /  Alb  3.4  /  TBili  0.3  /  DBili  x   /  AST  26  /  ALT  27  /  AlkPhos  231<H>  09-14-22 @ 17:50          PE:  General: NAD  Abdomen: Mildly distended, appropriately tender, incision c/d/i with dermabond prineo dressing  Lochia: WNL  Extremities: No calf tenderness    A/P: 40yo POD#1 s/p rLTCS.  Patient is stable and doing well post-operatively.      #PEC  -BPs stable.  100s/60s this AM  -Has BP Cuff at home, offered a prescription for BP cuff but patient declined   -instructions given on BP monitoring; TID; call MD office if greater than 140/90; report to emergency room is greater than 160/110  -reviewed signs and symptoms related to preeclampsia with patient such as HA, Change in vision, N/V. RUQ pain   -keep log BP's to present to MD during appointment or triage   -All questions answered, patient verbalized understanding     #Post-op csection   - Continue regular diet.  - Encouraged use of abdominal binder  - Increase ambulation.  - Continue motrin, tylenol, oxycodone PRN for pain control.    - AM CBC stable.  11.1/33.5-->9.6/29.7    Monique NOLASCO

## 2022-09-17 NOTE — DISCHARGE NOTE OB - PATIENT PORTAL LINK FT
You can access the FollowMyHealth Patient Portal offered by [facility] by registering at the following website: [email or web address]. By joining [email or web address]’s FollowMyHealth portal, you will also be able to view your health information using other applications (apps) compatible with our system.

## 2022-09-17 NOTE — DISCHARGE NOTE OB - NS MD DC FALL RISK RISK
For information on Fall & Injury Prevention, visit: [email or web address] OR  [email or web address] OR  https://www.cdc.gov/steadi/patient.html

## 2022-09-17 NOTE — PROGRESS NOTE ADULT - NS ATTEND AMEND GEN_ALL_CORE FT
OBGYN Attending    Pt seen at bedside. Agree with above.  Doing well POD#2.  Pain controlled.  Tolerating PO.  Voiding.  Ambulating. BP controlled without medication.  No signs/symptoms of worsening pre-eclampsia.   Abd soft, appropriately tender.  Incision clean/dry.intact.  Routine post-op care.  Discharge planning.    ROBERT Saenz MD
Attending Note   agrree with above  abd soft nontender no rebound/guarding   incisoin c/di   ext 1+ edema b/l   routine postop care  encourage ambulation     R Shaun ZUNIGA

## 2022-09-17 NOTE — DISCHARGE NOTE OB - CARE PROVIDER_API CALL
Aliza Jacobo)  Obstetrics and Gynecology  [address] [place] [address]  Phone: [phone]  Fax: [phone]  Follow Up Time:

## 2022-09-17 NOTE — DISCHARGE NOTE OB - CARE PLAN
Assessment and plan of treatment:	s/p  delivery.  nothing per vagina x 6 weeks, follow-up for blood pressure and incision check in 1 week, full post partum visit in 6 weeks.

## 2022-09-23 LAB — SURGICAL PATHOLOGY STUDY: SIGNIFICANT CHANGE UP

## 2022-11-04 ENCOUNTER — APPOINTMENT (OUTPATIENT)
Dept: DERMATOLOGY | Facility: CLINIC | Age: 42
End: 2022-11-04

## 2022-12-02 ENCOUNTER — NON-APPOINTMENT (OUTPATIENT)
Age: 42
End: 2022-12-02

## 2022-12-02 ENCOUNTER — APPOINTMENT (OUTPATIENT)
Dept: INTERNAL MEDICINE | Facility: CLINIC | Age: 42
End: 2022-12-02

## 2022-12-02 VITALS
BODY MASS INDEX: 28.7 KG/M2 | WEIGHT: 152 LBS | SYSTOLIC BLOOD PRESSURE: 101 MMHG | OXYGEN SATURATION: 99 % | RESPIRATION RATE: 18 BRPM | DIASTOLIC BLOOD PRESSURE: 68 MMHG | HEIGHT: 61 IN | TEMPERATURE: 98 F | HEART RATE: 65 BPM

## 2022-12-02 DIAGNOSIS — Z00.00 ENCOUNTER FOR GENERAL ADULT MEDICAL EXAMINATION W/OUT ABNORMAL FINDINGS: ICD-10-CM

## 2022-12-02 PROCEDURE — 36415 COLL VENOUS BLD VENIPUNCTURE: CPT

## 2022-12-02 PROCEDURE — 99386 PREV VISIT NEW AGE 40-64: CPT | Mod: 25

## 2022-12-02 NOTE — HISTORY OF PRESENT ILLNESS
[FreeTextEntry1] : Patient presents to establish care and annual physical exam.  [de-identified] : A 41 y/o F pt presents to office to establish care. Pt is doing well overall and has not gotten sick for the past year. \par Reports she is currently breast feeding and is not on any medication or supplementations.\par Denies any CP, Chest tightness, or SOB.\par Denies any abdominal pain, urinary symptom or change in bowel habits.\par Denies any fever, night sweats, or chills.\par Denies any anxiety or depression\par \par UTD with GYN. UTD with flu shot. Has not gone for  Mammogram\par FHx: Lymphoma\par SocHx: Non smoker, no alcohol use.

## 2022-12-02 NOTE — PHYSICAL EXAM
[No Acute Distress] : no acute distress [Well Nourished] : well nourished [Well Developed] : well developed [Well-Appearing] : well-appearing [Normal Sclera/Conjunctiva] : normal sclera/conjunctiva [PERRL] : pupils equal round and reactive to light [EOMI] : extraocular movements intact [Normal Outer Ear/Nose] : the outer ears and nose were normal in appearance [Normal Oropharynx] : the oropharynx was normal [No JVD] : no jugular venous distention [No Lymphadenopathy] : no lymphadenopathy [Supple] : supple [Thyroid Normal, No Nodules] : the thyroid was normal and there were no nodules present [No Respiratory Distress] : no respiratory distress  [No Accessory Muscle Use] : no accessory muscle use [Clear to Auscultation] : lungs were clear to auscultation bilaterally [Normal Rate] : normal rate  [Regular Rhythm] : with a regular rhythm [Normal S1, S2] : normal S1 and S2 [No Murmur] : no murmur heard [No Carotid Bruits] : no carotid bruits [No Abdominal Bruit] : a ~M bruit was not heard ~T in the abdomen [No Varicosities] : no varicosities [Pedal Pulses Present] : the pedal pulses are present [No Edema] : there was no peripheral edema [No Palpable Aorta] : no palpable aorta [No Extremity Clubbing/Cyanosis] : no extremity clubbing/cyanosis [Declined Breast Exam] : declined breast exam  [Soft] : abdomen soft [Non Tender] : non-tender [Non-distended] : non-distended [No Masses] : no abdominal mass palpated [No HSM] : no HSM [Normal Bowel Sounds] : normal bowel sounds [Normal Posterior Cervical Nodes] : no posterior cervical lymphadenopathy [Normal Anterior Cervical Nodes] : no anterior cervical lymphadenopathy [No CVA Tenderness] : no CVA  tenderness [No Spinal Tenderness] : no spinal tenderness [No Joint Swelling] : no joint swelling [Grossly Normal Strength/Tone] : grossly normal strength/tone [No Rash] : no rash [Coordination Grossly Intact] : coordination grossly intact [No Focal Deficits] : no focal deficits [Normal Gait] : normal gait [Deep Tendon Reflexes (DTR)] : deep tendon reflexes were 2+ and symmetric [Normal Affect] : the affect was normal [Normal Insight/Judgement] : insight and judgment were intact [de-identified] : Examined by GYN

## 2022-12-02 NOTE — ADDENDUM
[FreeTextEntry1] : I, Deandra Crenshaw, acted as a scribe on behalf of Dr. Fabrizio Clark MD, on 12/02/2022. \par \par All medical entries made by the scribe were at my, Dr. Fabrizio Clark MD, direction and personally dictated by me on 12/02/2022. I have reviewed the chart and agree that the record accurately reflects my personal performance of the history, physical exam, assessment and plan. I have also personally directed, reviewed, and agreed with the chart.

## 2022-12-02 NOTE — HEALTH RISK ASSESSMENT
[Good] : ~his/her~  mood as  good [Never] : Never [No] : In the past 12 months have you used drugs other than those required for medical reasons? No [0] : 2) Feeling down, depressed, or hopeless: Not at all (0) [PHQ-2 Negative - No further assessment needed] : PHQ-2 Negative - No further assessment needed [FreeTextEntry1] : Health Maintenance [de-identified] : GYN

## 2022-12-02 NOTE — ASSESSMENT
[FreeTextEntry1] : Annual Physical Exam\par -Blood work done today.\par -Check A1c, lipid panel and Vitamin levels.\par -BP is stable. EKG was done today with no abnormalities.\par -UTD with influenza vaccine.\par -UTD with GYN. Has not gone for mammogram as pt is currently breast feeding.\par -RTO annually or as needed

## 2022-12-06 LAB
25(OH)D3 SERPL-MCNC: 20.7 NG/ML
ALBUMIN SERPL ELPH-MCNC: 4.7 G/DL
ALP BLD-CCNC: 253 U/L
ALT SERPL-CCNC: 57 U/L
ANION GAP SERPL CALC-SCNC: 11 MMOL/L
APPEARANCE: CLEAR
AST SERPL-CCNC: 29 U/L
BACTERIA: NEGATIVE
BASOPHILS # BLD AUTO: 0.06 K/UL
BASOPHILS NFR BLD AUTO: 1.1 %
BILIRUB SERPL-MCNC: 0.7 MG/DL
BILIRUBIN URINE: NEGATIVE
BLOOD URINE: NEGATIVE
BUN SERPL-MCNC: 11 MG/DL
CALCIUM SERPL-MCNC: 9.7 MG/DL
CHLORIDE SERPL-SCNC: 102 MMOL/L
CHOLEST SERPL-MCNC: 210 MG/DL
CO2 SERPL-SCNC: 28 MMOL/L
COLOR: COLORLESS
CREAT SERPL-MCNC: 0.68 MG/DL
EGFR: 112 ML/MIN/1.73M2
EOSINOPHIL # BLD AUTO: 0.05 K/UL
EOSINOPHIL NFR BLD AUTO: 0.9 %
ESTIMATED AVERAGE GLUCOSE: 111 MG/DL
GLUCOSE QUALITATIVE U: NEGATIVE
GLUCOSE SERPL-MCNC: 85 MG/DL
HBA1C MFR BLD HPLC: 5.5 %
HCT VFR BLD CALC: 40.4 %
HDLC SERPL-MCNC: 83 MG/DL
HGB BLD-MCNC: 12.7 G/DL
HYALINE CASTS: 2 /LPF
IMM GRANULOCYTES NFR BLD AUTO: 0.4 %
KETONES URINE: NEGATIVE
LDLC SERPL CALC-MCNC: 108 MG/DL
LEUKOCYTE ESTERASE URINE: NEGATIVE
LYMPHOCYTES # BLD AUTO: 1.85 K/UL
LYMPHOCYTES NFR BLD AUTO: 33 %
MAN DIFF?: NORMAL
MCHC RBC-ENTMCNC: 28.1 PG
MCHC RBC-ENTMCNC: 31.4 GM/DL
MCV RBC AUTO: 89.4 FL
MICROSCOPIC-UA: NORMAL
MONOCYTES # BLD AUTO: 0.38 K/UL
MONOCYTES NFR BLD AUTO: 6.8 %
NEUTROPHILS # BLD AUTO: 3.25 K/UL
NEUTROPHILS NFR BLD AUTO: 57.8 %
NITRITE URINE: NEGATIVE
NONHDLC SERPL-MCNC: 127 MG/DL
PH URINE: 6.5
PLATELET # BLD AUTO: 337 K/UL
POTASSIUM SERPL-SCNC: 4.2 MMOL/L
PROT SERPL-MCNC: 7.6 G/DL
PROTEIN URINE: NEGATIVE
RBC # BLD: 4.52 M/UL
RBC # FLD: 13.8 %
RED BLOOD CELLS URINE: 1 /HPF
SODIUM SERPL-SCNC: 140 MMOL/L
SPECIFIC GRAVITY URINE: 1.01
SQUAMOUS EPITHELIAL CELLS: 0 /HPF
TRIGL SERPL-MCNC: 91 MG/DL
TSH SERPL-ACNC: 1.15 UIU/ML
UROBILINOGEN URINE: NORMAL
VIT B12 SERPL-MCNC: 848 PG/ML
WBC # FLD AUTO: 5.61 K/UL
WHITE BLOOD CELLS URINE: 0 /HPF

## 2023-01-11 DIAGNOSIS — Z00.00 ENCOUNTER FOR GENERAL ADULT MEDICAL EXAMINATION W/OUT ABNORMAL FINDINGS: ICD-10-CM

## 2023-01-11 LAB
B PERT DNA SPEC QL NAA+PROBE: NOT DETECTED
BORDETELLA PARAPERTUSSIS: NOT DETECTED
C PNEUM DNA SPEC QL NAA+PROBE: NOT DETECTED
FLUAV SUBTYP SPEC NAA+PROBE: NOT DETECTED
FLUBV RNA SPEC QL NAA+PROBE: NOT DETECTED
HADV DNA SPEC QL NAA+PROBE: NOT DETECTED
HCOV 229E RNA SPEC QL NAA+PROBE: NOT DETECTED
HCOV HKU1 RNA SPEC QL NAA+PROBE: NOT DETECTED
HCOV NL63 RNA SPEC QL NAA+PROBE: NOT DETECTED
HCOV OC43 RNA SPEC QL NAA+PROBE: NOT DETECTED
HMPV RNA SPEC QL NAA+PROBE: NOT DETECTED
HPIV1 RNA SPEC QL NAA+PROBE: NOT DETECTED
HPIV2 RNA SPEC QL NAA+PROBE: NOT DETECTED
HPIV3 RNA SPEC QL NAA+PROBE: NOT DETECTED
HPIV4 RNA SPEC QL NAA+PROBE: NOT DETECTED
M PNEUMO DNA SPEC QL NAA+PROBE: NOT DETECTED
RAPID RVP RESULT: DETECTED
RSV RNA SPEC QL NAA+PROBE: NOT DETECTED
RV+EV RNA SPEC QL NAA+PROBE: NOT DETECTED
SARS-COV-2 RNA PNL RESP NAA+PROBE: DETECTED

## 2023-01-13 ENCOUNTER — APPOINTMENT (OUTPATIENT)
Dept: INTERNAL MEDICINE | Facility: CLINIC | Age: 43
End: 2023-01-13
Payer: COMMERCIAL

## 2023-01-13 DIAGNOSIS — U07.1 COVID-19: ICD-10-CM

## 2023-01-13 PROCEDURE — 99441: CPT

## 2023-01-14 NOTE — HISTORY OF PRESENT ILLNESS
[Home] : at home, [unfilled] , at the time of the visit. [Medical Office: ([facility]– [place])___] : at the medical office located in  [FreeTextEntry8] : Telephonic visit done as patient was diagnosed with COVID, does have congestion sore throat body aches was vaccinated first of occurrencee  COVID\par Denies any shortness of breath fatigue nausea vomiting has run out of ibuprofen 600

## 2023-01-14 NOTE — ASSESSMENT
[FreeTextEntry1] : Profen given, did advise supportive care, if any shortness of breath to call office. Paxlovid given in case symptoms worsen.To be taken within 5 days of symtpom onset.

## 2023-01-16 DIAGNOSIS — J02.9 ACUTE PHARYNGITIS, UNSPECIFIED: ICD-10-CM

## 2023-01-16 RX ORDER — IBUPROFEN 600 MG/1
600 TABLET, FILM COATED ORAL 3 TIMES DAILY
Qty: 90 | Refills: 0 | Status: ACTIVE | COMMUNITY
Start: 2023-01-13 | End: 1900-01-01

## 2023-01-16 RX ORDER — NIRMATRELVIR AND RITONAVIR 300-100 MG
20 X 150 MG & KIT ORAL
Qty: 1 | Refills: 0 | Status: ACTIVE | COMMUNITY
Start: 2023-01-13 | End: 1900-01-01

## 2023-02-10 NOTE — OB PROVIDER TRIAGE NOTE - NSOBPROVIDERNOTE_OBGYN_ALL_OB_FT
Received report from Adilson CONLEY.    a/p: 41 y.o.  CAMILA 10/10/2022 @ 34.4 weeks pelvic pain with ambulation    UA pending    Adenike NP a/p: 41 y.o.  CAMILA 10/10/2022 @ 34.4 weeks pelvic pain with ambulation,  contractions    UA pending    Adenike, NP a/p: 41 y.o.  CAMILA 10/10/2022 @ 34.4 weeks pelvic pain with ambulation,  contractions    UA pending    0835  Discussed with Dr Dunn. Plan for cbc, coag panel, RL 1L IV bolus.    Adenike, NP a/p: 41 y.o.  CAMILA 10/10/2022 @ 34.4 weeks pelvic pain with ambulation,  contractions    UA pending    0835    nst reactive  toco: ctx q 2 mins  Discussed with Dr Dunn. Plan for cbc, coag panel, RL 1L IV bolus.    Adenike, NP a/p: 41 y.o.  CAMILA 10/10/2022 @ 34.4 weeks pelvic pain with ambulation,  contractions    UA pending    0835    nst reactive  toco: ctx q 2 mins  Discussed with Dr Dunn. Plan for cbc, coag panel, RL 1L IV bolus.    0945  Repeat VE no change. Discussed with Dr Chuy Dobbs at bedside. Plan for admission and observation. Plan of care reviewed with patient.     covid 19 swab collected and pending    XIOMARA Jeong a/p: 41 y.o.  CAMILA 10/03/2022 @ 35.4 weeks pelvic pain with ambulation,  contractions    UA pending    0835    nst reactive  toco: ctx q 2 mins  Discussed with Dr Dunn. Plan for cbc, coag panel, RL 1L IV bolus.    0945  Repeat VE no change. Discussed with Dr Chuy Dobbs at bedside. Plan for admission and observation. Plan of care reviewed with patient.     covid 19 swab collected and pending    Discussed with Dr Granda, PGY-3    JMidy, NP

## 2023-02-23 NOTE — OB RN INTRAOPERATIVE NOTE - NSROOMOUT1_OBGYN_ALL_OB_DT
----- Message from Sweta Lane PA-C sent at 2/23/2023  8:48 AM CST -----  PSA is back down to normal. Repeat in 6 months.   
Patient notified  He would like to have a cardiac scoring test done  
ordered  
15-Sep-2022 07:34

## 2023-05-19 ENCOUNTER — APPOINTMENT (OUTPATIENT)
Dept: PODIATRY | Facility: CLINIC | Age: 43
End: 2023-05-19
Payer: COMMERCIAL

## 2023-05-19 DIAGNOSIS — M77.8 OTHER ENTHESOPATHIES, NOT ELSEWHERE CLASSIFIED: ICD-10-CM

## 2023-05-19 DIAGNOSIS — B35.1 TINEA UNGUIUM: ICD-10-CM

## 2023-05-19 PROCEDURE — 99203 OFFICE O/P NEW LOW 30 MIN: CPT

## 2023-05-19 RX ORDER — EFINACONAZOLE 100 MG/ML
10 SOLUTION TOPICAL
Qty: 1 | Refills: 3 | Status: ACTIVE | COMMUNITY
Start: 2023-05-19 | End: 1900-01-01

## 2023-05-19 RX ORDER — EFINACONAZOLE 100 MG/ML
10 SOLUTION TOPICAL
Qty: 1 | Refills: 4 | Status: ACTIVE | COMMUNITY
Start: 2023-05-19 | End: 1900-01-01

## 2023-05-22 NOTE — PHYSICAL EXAM
[2+] : left foot dorsalis pedis 2+ [FreeTextEntry3] : Temperature gradient within normal limits. CFT: 3 seconds x10. Hair growth diminished. Skin turgor is intact. [de-identified] : Arch morphology: moderate arch morphology bilaterally with prominent metatarsal heads submet. 5 with hyperkeratotic lesions. [FreeTextEntry1] :  Negative ulcerations, foreign body or signs of acute infection. Plantar calcaneal aspects are thickened hyperkeratosis with negative fissures or breaks in skin. There is negative edema, erythema or acute signs of infection. Negative interdigital macerations are noted. Bilateral hallux nails with distal discoloration. Negative lysis. They are brittle. Negative pain on applied pressure. Negative fluctuance, pyogenic granuloma or drainage from the sites. Tenderness on palpation submet. 5, left foot greater than right foot. Negative verruca.

## 2023-05-22 NOTE — ASSESSMENT
[FreeTextEntry1] : \par Impression: Capsulitis bilateral. Onychomycosis bilaterally.\par \par Treatment: Discussed findings and conditions with the patient. Discussed pedal care with the patient. Patient was advised to soak the toes in one to one mixture in white vinegar and water, cleanse gently once daily. Discussed antifungal topical medications with the patient along with PO, which I would not recommend at this time due to the lack of severity of the nail discoloration, and possible laser treatments if topical medications do not work. Discussed proper shoe gear with the patient. She was recommended New Balance Fresh Foams, On Clouds, and Hoka that have more rocker-bottom and support for the shoe. The shoes she is currently wearing are a pair of sneakers which are shallow with lack of support and applies too much pressure on the metatarsal heads. Discussed shoe gear for inside the house. Recommended Oofos to off-load the forefoot as well and O'Toño's cream for the hyperkeratosis and to help moisturize daily. I ePrescribed Jublia for topical application to perform daily. Patient is to return in approximately 2 months. Any pain, problems, or concerns, any issues with the medication she is to contact the office.

## 2023-05-22 NOTE — HISTORY OF PRESENT ILLNESS
[Sneakers] : joyce [FreeTextEntry1] : Patient presents today wearing sneakers complaining of pain in the left and right submet. 5. Denies any trauma to the area. She states it has been going on and off for several years. The patient also complains of discoloration of both big toenails, again going on and off for several years. She has denied any medical treatments for the condition or evaluation. She has tried OTC medications which she states has not helped. She does go for pedicures regularly. Denies any infections or drainage from the sites.

## 2023-05-25 NOTE — OB RN PATIENT PROFILE - CENTRAL VENOUS CATHETER
no

## 2023-05-26 RX ORDER — CICLOPIROX 80 MG/ML
8 SOLUTION TOPICAL
Qty: 1 | Refills: 4 | Status: ACTIVE | COMMUNITY
Start: 2023-05-26 | End: 1900-01-01

## 2024-03-20 ENCOUNTER — NON-APPOINTMENT (OUTPATIENT)
Age: 44
End: 2024-03-20

## 2024-03-20 DIAGNOSIS — Z80.7 FAMILY HISTORY OF OTHER MALIGNANT NEOPLASMS OF LYMPHOID, HEMATOPOIETIC AND RELATED TISSUES: ICD-10-CM
